# Patient Record
Sex: FEMALE | Race: BLACK OR AFRICAN AMERICAN | NOT HISPANIC OR LATINO | Employment: OTHER | URBAN - METROPOLITAN AREA
[De-identification: names, ages, dates, MRNs, and addresses within clinical notes are randomized per-mention and may not be internally consistent; named-entity substitution may affect disease eponyms.]

---

## 2021-04-05 ENCOUNTER — LAB REQUISITION (OUTPATIENT)
Dept: LAB | Facility: HOSPITAL | Age: 61
End: 2021-04-05

## 2021-04-05 DIAGNOSIS — K12.2 CELLULITIS AND ABSCESS OF MOUTH: ICD-10-CM

## 2021-04-05 DIAGNOSIS — L02.11 CUTANEOUS ABSCESS OF NECK: ICD-10-CM

## 2021-04-05 LAB
ANION GAP SERPL CALCULATED.3IONS-SCNC: 10 MMOL/L (ref 5–15)
BASOPHILS # BLD AUTO: 0.04 10*3/MM3 (ref 0–0.2)
BASOPHILS NFR BLD AUTO: 1.1 % (ref 0–1.5)
BUN SERPL-MCNC: 30 MG/DL (ref 8–23)
BUN/CREAT SERPL: 8 (ref 7–25)
CALCIUM SPEC-SCNC: 8.9 MG/DL (ref 8.6–10.5)
CHLORIDE SERPL-SCNC: 97 MMOL/L (ref 98–107)
CO2 SERPL-SCNC: 26 MMOL/L (ref 22–29)
CREAT SERPL-MCNC: 3.73 MG/DL (ref 0.57–1)
CRP SERPL-MCNC: 1.18 MG/DL (ref 0–0.5)
DEPRECATED RDW RBC AUTO: 47.1 FL (ref 37–54)
EOSINOPHIL # BLD AUTO: 0.19 10*3/MM3 (ref 0–0.4)
EOSINOPHIL NFR BLD AUTO: 5.1 % (ref 0.3–6.2)
ERYTHROCYTE [DISTWIDTH] IN BLOOD BY AUTOMATED COUNT: 15.5 % (ref 12.3–15.4)
GFR SERPL CREATININE-BSD FRML MDRD: 12 ML/MIN/1.73
GFR SERPL CREATININE-BSD FRML MDRD: 15 ML/MIN/1.73
GLUCOSE SERPL-MCNC: 195 MG/DL (ref 65–99)
HCT VFR BLD AUTO: 31.5 % (ref 34–46.6)
HGB BLD-MCNC: 11.4 G/DL (ref 12–15.9)
IMM GRANULOCYTES # BLD AUTO: 0.01 10*3/MM3 (ref 0–0.05)
IMM GRANULOCYTES NFR BLD AUTO: 0.3 % (ref 0–0.5)
LYMPHOCYTES # BLD AUTO: 1.13 10*3/MM3 (ref 0.7–3.1)
LYMPHOCYTES NFR BLD AUTO: 30.5 % (ref 19.6–45.3)
MCH RBC QN AUTO: 30.8 PG (ref 26.6–33)
MCHC RBC AUTO-ENTMCNC: 36.2 G/DL (ref 31.5–35.7)
MCV RBC AUTO: 85.1 FL (ref 79–97)
MONOCYTES # BLD AUTO: 0.54 10*3/MM3 (ref 0.1–0.9)
MONOCYTES NFR BLD AUTO: 14.6 % (ref 5–12)
NEUTROPHILS NFR BLD AUTO: 1.8 10*3/MM3 (ref 1.7–7)
NEUTROPHILS NFR BLD AUTO: 48.4 % (ref 42.7–76)
NRBC BLD AUTO-RTO: 0 /100 WBC (ref 0–0.2)
PLATELET # BLD AUTO: 285 10*3/MM3 (ref 140–450)
PMV BLD AUTO: 9.5 FL (ref 6–12)
POTASSIUM SERPL-SCNC: 2.8 MMOL/L (ref 3.5–5.2)
RBC # BLD AUTO: 3.7 10*6/MM3 (ref 3.77–5.28)
SODIUM SERPL-SCNC: 133 MMOL/L (ref 136–145)
WBC # BLD AUTO: 3.71 10*3/MM3 (ref 3.4–10.8)

## 2021-04-05 PROCEDURE — 85025 COMPLETE CBC W/AUTO DIFF WBC: CPT | Performed by: INTERNAL MEDICINE

## 2021-04-05 PROCEDURE — 80048 BASIC METABOLIC PNL TOTAL CA: CPT | Performed by: INTERNAL MEDICINE

## 2021-04-05 PROCEDURE — 86140 C-REACTIVE PROTEIN: CPT | Performed by: INTERNAL MEDICINE

## 2021-04-06 ENCOUNTER — APPOINTMENT (OUTPATIENT)
Dept: GENERAL RADIOLOGY | Facility: HOSPITAL | Age: 61
End: 2021-04-06

## 2021-04-06 ENCOUNTER — HOSPITAL ENCOUNTER (OUTPATIENT)
Facility: HOSPITAL | Age: 61
Setting detail: OBSERVATION
Discharge: HOME | End: 2021-04-07
Attending: EMERGENCY MEDICINE | Admitting: INTERNAL MEDICINE

## 2021-04-06 DIAGNOSIS — L08.9 NECK INFECTION: ICD-10-CM

## 2021-04-06 DIAGNOSIS — N17.9 ACUTE RENAL FAILURE, UNSPECIFIED ACUTE RENAL FAILURE TYPE (HCC): ICD-10-CM

## 2021-04-06 DIAGNOSIS — E87.6 HYPOKALEMIA: Primary | ICD-10-CM

## 2021-04-06 PROBLEM — E11.9 DM2 (DIABETES MELLITUS, TYPE 2) (HCC): Status: ACTIVE | Noted: 2021-04-06

## 2021-04-06 PROBLEM — K04.7 DENTAL INFECTION: Status: ACTIVE | Noted: 2021-04-06

## 2021-04-06 PROBLEM — I10 HTN (HYPERTENSION): Status: ACTIVE | Noted: 2021-04-06

## 2021-04-06 LAB
ALBUMIN SERPL-MCNC: 3 G/DL (ref 3.5–5.2)
ALBUMIN/GLOB SERPL: 1 G/DL
ALP SERPL-CCNC: 71 U/L (ref 39–117)
ALT SERPL W P-5'-P-CCNC: 34 U/L (ref 1–33)
ANION GAP SERPL CALCULATED.3IONS-SCNC: 12 MMOL/L (ref 5–15)
AST SERPL-CCNC: 21 U/L (ref 1–32)
BACTERIA UR QL AUTO: ABNORMAL /HPF
BASOPHILS # BLD AUTO: 0.04 10*3/MM3 (ref 0–0.2)
BASOPHILS NFR BLD AUTO: 1 % (ref 0–1.5)
BILIRUB SERPL-MCNC: 0.9 MG/DL (ref 0–1.2)
BILIRUB UR QL STRIP: NEGATIVE
BUN SERPL-MCNC: 29 MG/DL (ref 8–23)
BUN/CREAT SERPL: 10.4 (ref 7–25)
CALCIUM SPEC-SCNC: 9 MG/DL (ref 8.6–10.5)
CHLORIDE SERPL-SCNC: 97 MMOL/L (ref 98–107)
CLARITY UR: ABNORMAL
CO2 SERPL-SCNC: 25 MMOL/L (ref 22–29)
COLOR UR: YELLOW
CREAT SERPL-MCNC: 2.8 MG/DL (ref 0.57–1)
DEPRECATED RDW RBC AUTO: 46.8 FL (ref 37–54)
EOSINOPHIL # BLD AUTO: 0.15 10*3/MM3 (ref 0–0.4)
EOSINOPHIL NFR BLD AUTO: 3.6 % (ref 0.3–6.2)
ERYTHROCYTE [DISTWIDTH] IN BLOOD BY AUTOMATED COUNT: 15.8 % (ref 12.3–15.4)
GFR SERPL CREATININE-BSD FRML MDRD: 21 ML/MIN/1.73
GLOBULIN UR ELPH-MCNC: 3 GM/DL
GLUCOSE BLDC GLUCOMTR-MCNC: 129 MG/DL (ref 70–130)
GLUCOSE BLDC GLUCOMTR-MCNC: 154 MG/DL (ref 70–130)
GLUCOSE SERPL-MCNC: 188 MG/DL (ref 65–99)
GLUCOSE UR STRIP-MCNC: NEGATIVE MG/DL
HCT VFR BLD AUTO: 29.9 % (ref 34–46.6)
HGB BLD-MCNC: 10.8 G/DL (ref 12–15.9)
HGB UR QL STRIP.AUTO: NEGATIVE
HOLD SPECIMEN: NORMAL
IMM GRANULOCYTES # BLD AUTO: 0.02 10*3/MM3 (ref 0–0.05)
IMM GRANULOCYTES NFR BLD AUTO: 0.5 % (ref 0–0.5)
KETONES UR QL STRIP: ABNORMAL
LEUKOCYTE ESTERASE UR QL STRIP.AUTO: NEGATIVE
LYMPHOCYTES # BLD AUTO: 1.02 10*3/MM3 (ref 0.7–3.1)
LYMPHOCYTES NFR BLD AUTO: 24.7 % (ref 19.6–45.3)
MAGNESIUM SERPL-MCNC: 1.8 MG/DL (ref 1.6–2.4)
MCH RBC QN AUTO: 30.2 PG (ref 26.6–33)
MCHC RBC AUTO-ENTMCNC: 36.1 G/DL (ref 31.5–35.7)
MCV RBC AUTO: 83.5 FL (ref 79–97)
MONOCYTES # BLD AUTO: 0.69 10*3/MM3 (ref 0.1–0.9)
MONOCYTES NFR BLD AUTO: 16.7 % (ref 5–12)
NEUTROPHILS NFR BLD AUTO: 2.21 10*3/MM3 (ref 1.7–7)
NEUTROPHILS NFR BLD AUTO: 53.5 % (ref 42.7–76)
NITRITE UR QL STRIP: POSITIVE
NRBC BLD AUTO-RTO: 0 /100 WBC (ref 0–0.2)
NT-PROBNP SERPL-MCNC: 193.3 PG/ML (ref 0–900)
PH UR STRIP.AUTO: <=5 [PH] (ref 5–8)
PLATELET # BLD AUTO: 237 10*3/MM3 (ref 140–450)
PMV BLD AUTO: 8.9 FL (ref 6–12)
POTASSIUM SERPL-SCNC: 2.5 MMOL/L (ref 3.5–5.2)
PROT SERPL-MCNC: 6 G/DL (ref 6–8.5)
PROT UR QL STRIP: ABNORMAL
QT INTERVAL: 412 MS
QTC INTERVAL: 484 MS
RBC # BLD AUTO: 3.58 10*6/MM3 (ref 3.77–5.28)
RBC # UR: ABNORMAL /HPF
REF LAB TEST METHOD: ABNORMAL
SARS-COV-2 RDRP RESP QL NAA+PROBE: NORMAL
SARS-COV-2 RNA PNL SPEC NAA+PROBE: NOT DETECTED
SODIUM SERPL-SCNC: 134 MMOL/L (ref 136–145)
SP GR UR STRIP: 1.02 (ref 1–1.03)
SQUAMOUS #/AREA URNS HPF: ABNORMAL /HPF
TROPONIN T SERPL-MCNC: <0.01 NG/ML (ref 0–0.03)
UROBILINOGEN UR QL STRIP: ABNORMAL
WBC # BLD AUTO: 4.13 10*3/MM3 (ref 3.4–10.8)
WBC UR QL AUTO: ABNORMAL /HPF
WHOLE BLOOD HOLD SPECIMEN: NORMAL
WHOLE BLOOD HOLD SPECIMEN: NORMAL

## 2021-04-06 PROCEDURE — 25010000003 POTASSIUM CHLORIDE 10 MEQ/100ML SOLUTION: Performed by: PHYSICIAN ASSISTANT

## 2021-04-06 PROCEDURE — 81001 URINALYSIS AUTO W/SCOPE: CPT | Performed by: PHYSICIAN ASSISTANT

## 2021-04-06 PROCEDURE — G0378 HOSPITAL OBSERVATION PER HR: HCPCS

## 2021-04-06 PROCEDURE — 25810000003 SODIUM CHLORIDE 0.9 % WITH KCL 20 MEQ 20-0.9 MEQ/L-% SOLUTION: Performed by: INTERNAL MEDICINE

## 2021-04-06 PROCEDURE — 85025 COMPLETE CBC W/AUTO DIFF WBC: CPT | Performed by: PHYSICIAN ASSISTANT

## 2021-04-06 PROCEDURE — U0004 COV-19 TEST NON-CDC HGH THRU: HCPCS | Performed by: INTERNAL MEDICINE

## 2021-04-06 PROCEDURE — 25010000002 HEPARIN (PORCINE) PER 1000 UNITS: Performed by: INTERNAL MEDICINE

## 2021-04-06 PROCEDURE — 96372 THER/PROPH/DIAG INJ SC/IM: CPT

## 2021-04-06 PROCEDURE — 87635 SARS-COV-2 COVID-19 AMP PRB: CPT | Performed by: PHYSICIAN ASSISTANT

## 2021-04-06 PROCEDURE — 82962 GLUCOSE BLOOD TEST: CPT

## 2021-04-06 PROCEDURE — 83735 ASSAY OF MAGNESIUM: CPT | Performed by: PHYSICIAN ASSISTANT

## 2021-04-06 PROCEDURE — 96361 HYDRATE IV INFUSION ADD-ON: CPT

## 2021-04-06 PROCEDURE — 99220 PR INITIAL OBSERVATION CARE/DAY 70 MINUTES: CPT | Performed by: INTERNAL MEDICINE

## 2021-04-06 PROCEDURE — 99285 EMERGENCY DEPT VISIT HI MDM: CPT

## 2021-04-06 PROCEDURE — 93005 ELECTROCARDIOGRAM TRACING: CPT | Performed by: EMERGENCY MEDICINE

## 2021-04-06 PROCEDURE — C9803 HOPD COVID-19 SPEC COLLECT: HCPCS

## 2021-04-06 PROCEDURE — 71045 X-RAY EXAM CHEST 1 VIEW: CPT

## 2021-04-06 PROCEDURE — 84484 ASSAY OF TROPONIN QUANT: CPT | Performed by: PHYSICIAN ASSISTANT

## 2021-04-06 PROCEDURE — 83880 ASSAY OF NATRIURETIC PEPTIDE: CPT | Performed by: PHYSICIAN ASSISTANT

## 2021-04-06 PROCEDURE — 96365 THER/PROPH/DIAG IV INF INIT: CPT

## 2021-04-06 PROCEDURE — 80053 COMPREHEN METABOLIC PANEL: CPT | Performed by: PHYSICIAN ASSISTANT

## 2021-04-06 RX ORDER — POTASSIUM CHLORIDE 750 MG/1
10 CAPSULE, EXTENDED RELEASE ORAL DAILY
COMMUNITY

## 2021-04-06 RX ORDER — HEPARIN SODIUM 5000 [USP'U]/ML
5000 INJECTION, SOLUTION INTRAVENOUS; SUBCUTANEOUS EVERY 8 HOURS SCHEDULED
Status: DISCONTINUED | OUTPATIENT
Start: 2021-04-06 | End: 2021-04-07 | Stop reason: HOSPADM

## 2021-04-06 RX ORDER — POTASSIUM CHLORIDE 750 MG/1
40 CAPSULE, EXTENDED RELEASE ORAL ONCE
Status: COMPLETED | OUTPATIENT
Start: 2021-04-06 | End: 2021-04-06

## 2021-04-06 RX ORDER — POTASSIUM CHLORIDE 1.5 G/1.77G
40 POWDER, FOR SOLUTION ORAL AS NEEDED
Status: DISCONTINUED | OUTPATIENT
Start: 2021-04-06 | End: 2021-04-07 | Stop reason: HOSPADM

## 2021-04-06 RX ORDER — POTASSIUM CHLORIDE 7.45 MG/ML
10 INJECTION INTRAVENOUS ONCE
Status: DISCONTINUED | OUTPATIENT
Start: 2021-04-06 | End: 2021-04-07

## 2021-04-06 RX ORDER — POTASSIUM CHLORIDE 7.45 MG/ML
10 INJECTION INTRAVENOUS ONCE
Status: COMPLETED | OUTPATIENT
Start: 2021-04-06 | End: 2021-04-06

## 2021-04-06 RX ORDER — SODIUM CHLORIDE 9 MG/ML
125 INJECTION, SOLUTION INTRAVENOUS CONTINUOUS
Status: DISCONTINUED | OUTPATIENT
Start: 2021-04-06 | End: 2021-04-07

## 2021-04-06 RX ORDER — METRONIDAZOLE 500 MG/1
500 TABLET ORAL 3 TIMES DAILY
COMMUNITY

## 2021-04-06 RX ORDER — LISINOPRIL 10 MG/1
10 TABLET ORAL DAILY
COMMUNITY
End: 2021-04-07 | Stop reason: HOSPADM

## 2021-04-06 RX ORDER — CARVEDILOL 12.5 MG/1
25 TABLET ORAL 2 TIMES DAILY WITH MEALS
Status: DISCONTINUED | OUTPATIENT
Start: 2021-04-06 | End: 2021-04-07 | Stop reason: HOSPADM

## 2021-04-06 RX ORDER — CARVEDILOL 25 MG/1
25 TABLET ORAL 2 TIMES DAILY WITH MEALS
COMMUNITY

## 2021-04-06 RX ORDER — POTASSIUM CHLORIDE 750 MG/1
40 CAPSULE, EXTENDED RELEASE ORAL AS NEEDED
Status: DISCONTINUED | OUTPATIENT
Start: 2021-04-06 | End: 2021-04-07 | Stop reason: HOSPADM

## 2021-04-06 RX ORDER — POTASSIUM CHLORIDE 750 MG/1
10 CAPSULE, EXTENDED RELEASE ORAL DAILY
Status: DISCONTINUED | OUTPATIENT
Start: 2021-04-07 | End: 2021-04-07 | Stop reason: HOSPADM

## 2021-04-06 RX ORDER — ATORVASTATIN CALCIUM 10 MG/1
10 TABLET, FILM COATED ORAL NIGHTLY
Status: DISCONTINUED | OUTPATIENT
Start: 2021-04-06 | End: 2021-04-07 | Stop reason: HOSPADM

## 2021-04-06 RX ORDER — NICOTINE POLACRILEX 4 MG
15 LOZENGE BUCCAL
Status: DISCONTINUED | OUTPATIENT
Start: 2021-04-06 | End: 2021-04-07 | Stop reason: HOSPADM

## 2021-04-06 RX ORDER — ACETAMINOPHEN 325 MG/1
650 TABLET ORAL EVERY 4 HOURS PRN
Status: DISCONTINUED | OUTPATIENT
Start: 2021-04-06 | End: 2021-04-07 | Stop reason: HOSPADM

## 2021-04-06 RX ORDER — LOSARTAN POTASSIUM 100 MG/1
100 TABLET ORAL DAILY
Status: ON HOLD | COMMUNITY
End: 2021-04-07 | Stop reason: SDUPTHER

## 2021-04-06 RX ORDER — FUROSEMIDE 20 MG/1
20 TABLET ORAL DAILY
Status: ON HOLD | COMMUNITY
End: 2021-04-07 | Stop reason: SDUPTHER

## 2021-04-06 RX ORDER — SODIUM CHLORIDE 0.9 % (FLUSH) 0.9 %
10 SYRINGE (ML) INJECTION AS NEEDED
Status: DISCONTINUED | OUTPATIENT
Start: 2021-04-06 | End: 2021-04-07 | Stop reason: HOSPADM

## 2021-04-06 RX ORDER — SACCHAROMYCES BOULARDII 250 MG
250 CAPSULE ORAL 2 TIMES DAILY
Status: DISCONTINUED | OUTPATIENT
Start: 2021-04-06 | End: 2021-04-07 | Stop reason: HOSPADM

## 2021-04-06 RX ORDER — METRONIDAZOLE 500 MG/1
500 TABLET ORAL EVERY 8 HOURS SCHEDULED
Status: DISCONTINUED | OUTPATIENT
Start: 2021-04-06 | End: 2021-04-07 | Stop reason: HOSPADM

## 2021-04-06 RX ORDER — DEXTROSE MONOHYDRATE 25 G/50ML
25 INJECTION, SOLUTION INTRAVENOUS
Status: DISCONTINUED | OUTPATIENT
Start: 2021-04-06 | End: 2021-04-07 | Stop reason: HOSPADM

## 2021-04-06 RX ORDER — ATORVASTATIN CALCIUM 10 MG/1
10 TABLET, FILM COATED ORAL NIGHTLY
COMMUNITY

## 2021-04-06 RX ORDER — SODIUM CHLORIDE AND POTASSIUM CHLORIDE 150; 900 MG/100ML; MG/100ML
100 INJECTION, SOLUTION INTRAVENOUS CONTINUOUS
Status: DISCONTINUED | OUTPATIENT
Start: 2021-04-06 | End: 2021-04-07 | Stop reason: HOSPADM

## 2021-04-06 RX ORDER — AMLODIPINE BESYLATE 10 MG/1
10 TABLET ORAL DAILY
COMMUNITY

## 2021-04-06 RX ORDER — SODIUM CHLORIDE 0.9 % (FLUSH) 0.9 %
10 SYRINGE (ML) INJECTION EVERY 12 HOURS SCHEDULED
Status: DISCONTINUED | OUTPATIENT
Start: 2021-04-06 | End: 2021-04-07 | Stop reason: HOSPADM

## 2021-04-06 RX ORDER — AMLODIPINE BESYLATE 10 MG/1
10 TABLET ORAL DAILY
Status: DISCONTINUED | OUTPATIENT
Start: 2021-04-06 | End: 2021-04-07 | Stop reason: HOSPADM

## 2021-04-06 RX ADMIN — SODIUM CHLORIDE 125 ML/HR: 9 INJECTION, SOLUTION INTRAVENOUS at 16:38

## 2021-04-06 RX ADMIN — METRONIDAZOLE 500 MG: 500 TABLET ORAL at 18:17

## 2021-04-06 RX ADMIN — CARVEDILOL 25 MG: 12.5 TABLET, FILM COATED ORAL at 18:17

## 2021-04-06 RX ADMIN — NAFCILLIN SODIUM 12 G: 2 INJECTION, POWDER, LYOPHILIZED, FOR SOLUTION INTRAMUSCULAR; INTRAVENOUS at 16:37

## 2021-04-06 RX ADMIN — POTASSIUM CHLORIDE 10 MEQ: 7.46 INJECTION, SOLUTION INTRAVENOUS at 16:46

## 2021-04-06 RX ADMIN — METRONIDAZOLE 500 MG: 500 TABLET ORAL at 21:00

## 2021-04-06 RX ADMIN — SODIUM CHLORIDE 500 ML: 9 INJECTION, SOLUTION INTRAVENOUS at 12:13

## 2021-04-06 RX ADMIN — AMLODIPINE BESYLATE 10 MG: 10 TABLET ORAL at 18:16

## 2021-04-06 RX ADMIN — SODIUM CHLORIDE 125 ML/HR: 9 INJECTION, SOLUTION INTRAVENOUS at 12:13

## 2021-04-06 RX ADMIN — POTASSIUM CHLORIDE 10 MEQ: 7.46 INJECTION, SOLUTION INTRAVENOUS at 12:15

## 2021-04-06 RX ADMIN — ATORVASTATIN CALCIUM 10 MG: 10 TABLET, FILM COATED ORAL at 21:00

## 2021-04-06 RX ADMIN — POTASSIUM CHLORIDE 10 MEQ: 7.46 INJECTION, SOLUTION INTRAVENOUS at 16:36

## 2021-04-06 RX ADMIN — SODIUM CHLORIDE, PRESERVATIVE FREE 10 ML: 5 INJECTION INTRAVENOUS at 20:56

## 2021-04-06 RX ADMIN — POTASSIUM CHLORIDE 40 MEQ: 750 CAPSULE, EXTENDED RELEASE ORAL at 12:17

## 2021-04-06 RX ADMIN — Medication 250 MG: at 21:00

## 2021-04-06 RX ADMIN — HEPARIN SODIUM 5000 UNITS: 5000 INJECTION INTRAVENOUS; SUBCUTANEOUS at 21:00

## 2021-04-06 RX ADMIN — POTASSIUM CHLORIDE AND SODIUM CHLORIDE 100 ML/HR: 900; 150 INJECTION, SOLUTION INTRAVENOUS at 16:39

## 2021-04-06 NOTE — PROGRESS NOTES
Discharge Planning Assessment   Lizzeth     Patient Name: Paola Pizano  MRN: [id number]  Today's Date: 4/6/2021    Admit Date: 4/6/2021    Discharge Needs Assessment     Row Name 04/06/21 1404       Living Environment    Lives With  spouse;child(satish), adult    Current Living Arrangements  home/apartment/condo    Primary Care Provided by  self    Provides Primary Care For  no one    Family Caregiver if Needed  child(satish), adult    Quality of Family Relationships  helpful;involved;supportive    Able to Return to Prior Arrangements  yes       Resource/Environmental Concerns    Resource/Environmental Concerns  none    Transportation Concerns  car, none       Transition Planning    Patient/Family Anticipates Transition to  home with family    Patient/Family Anticipated Services at Transition  ;home health care    Transportation Anticipated  family or friend will provide       Discharge Needs Assessment    Readmission Within the Last 30 Days  no previous admission in last 30 days    Current Outpatient/Agency/Support Group  homecare agency    Equipment Currently Used at Home  rollator;wound care supplies    Concerns to be Addressed  discharge planning    Anticipated Changes Related to Illness  none    Equipment Needed After Discharge  none    Outpatient/Agency/Support Group Needs  homecare agency        Discharge Plan     Row Name 04/06/21 1404       Plan    Plan  Initial    Plan Comments  CM spoke with patient at bedside. Patient resides in [facility] with her disabled spouse and adult son. Patient is primary caregiver for spouse. Patient is independent with ADL's. Patient does have a rollator and uses it as needed. Patient was admitted to  on 3/19 to 3/30 for a dental/neck abscess per patient. Patient has been on home IV abx. Patient's son has been infusing the IV abx. IV abx to be completed on 4/8. Patient states her home health company is [facility]. PCP is through [facility].  Patient is concerned about who will take care of her spouse while she is in the hospital since her spouse cannot take care of himself. Patient states her son is in the home at times, however, he has to work. CM will ask  to assist. Patient anticipates no discharge planning needs currently. CM following,    Final Discharge Disposition Code  30 - still a patient        Continued Care and Services - Admitted Since 4/6/2021    Coordination has not been started for this encounter.         Demographic Summary     Row Name 04/06/21 1402       General Information    Arrived From  home    Referral Source  emergency department    Reason for Consult  discharge planning    Preferred Language  English     Used During This Interaction  no    General Information Comments  PCP: [facility]        Functional Status     Row Name 04/06/21 1404       Functional Status    Usual Activity Tolerance  good    Current Activity Tolerance  moderate       Functional Status, IADL    Medications  independent    Meal Preparation  independent    Housekeeping  independent    Laundry  independent    Shopping  independent       Mental Status    General Appearance WDL  WDL       Mental Status Summary    Recent Changes in Mental Status/Cognitive Functioning  no changes       Employment/    Employment Status  disabled                Janet Tripp RN

## 2021-04-06 NOTE — PROGRESS NOTES
Continued Stay Note   Lizzeth     Patient Name: Paola Pizano  MRN: [id number]  Today's Date: 4/6/2021    Admit Date: 4/6/2021    Discharge Plan     Row Name 04/06/21 1508       Plan    Plan Home when medically stable    Patient/Family in Agreement with Plan -yes Patient    Plan Comments Spoke with patient regarding home situation/care for spouse while she is hospitalized.  States spouse is total care and can be difficult to manage, her adult son will be able to stay with spouse for a few days but not much longer.  Reports she was told today if she gets better she may be able to go home tomorrow.  Will update floor  regarding situation and plan for her to f/u with patient tomorrow.  Sharon Espinoza, Ext. [phone]    Final Discharge Disposition Code 01 - home or self-care    Row Name 04/06/21 1406       Plan    Plan Initial    Plan Comments CM spoke with patient at bedside. Patient resides in [facility] with her disabled spouse and aduly son. Patient is primary caregiver for spouse. Patient is independent with ADL's. Patient does have a rollator and uses it as needed. Patient was admitted to  on 3/19 to 3/30 for a dental/neck abscess per patient. Patient has been on home IV abx. Patient's son has been infusing the IV abx. Patient states her home health company is [facility]. PCP is through [facility]. Patient is concerned about who will take care of her spouse while she is in the hospital since her spouse cannot take care of himself. Patient states her son is in the home at times, however, he has to work. CM will ask  to assist. Patient anticipated no discharge planning needs. CM following,    Final Discharge Disposition Code 30 - still a patient        Discharge Codes    No documentation.             PIETRO Aponte

## 2021-04-06 NOTE — H&P
[facility] Medicine Services  HISTORY AND PHYSICAL    Patient Name: Paola Pizano  : 1960  MRN: [id number]  Primary Care Physician: Provider, No Known  Date of admission: 2021      Subjective   Subjective     Chief Complaint:  Abnormal lab    HPI:  Paola Pizano is a 60 y.o. female with a history of hypertension, diabetes who presents to the emergency room after having some abnormal blood work through home health.  Patient was recently admitted from approximately 3/19 through 3/30 at .  Patient is a somewhat poor historian but it sounds like began as a dental infection and ultimately had what appears to be 2 submandibular abscesses drained.  I have been unable to locate discharge summary obtained by the emergency room to verify this information.  Regardless she was discharged on continuous nafcillin infusion along with oral Flagyl.  She has follow-up on  with infectious disease at .  Blood work done through our system yesterday revealed a potassium of 2.8 and a creatinine of 3.7.  Apparently records revealed a normal creatinine at the time of discharge.  Today creatinine is already improved to 2.8, though potassium is lower at 2.5.  She says just in the last day or 2 that her appetite and taste is improving.  Feels that prior to that she was not eating and drinking very much.  She had an episode of emesis.  Her bowels have been looser than normal as well.  She has remained on her Lasix.  Home medication list shows both ACE inhibitor and ARB, she is unsure what she is taking.  White blood cell count is normal.  No fevers at home.  Overall she is feeling improved and hopeful to get home soon.  Home health has been attending to her neck incisions, minimal drainage at this point.        COVID Details: [x] No Symptoms OR  Date of Symptom Onset:  __ Date of first positive COVID test:  __       Symptoms: [] Fever []  Cough [] Shortness of breath [] Change in taste or smell        Risks: [] Direct Exposure [] High risk facility [] None known       Review of Systems   Gen- No fevers, chills  CV- No chest pain, palpitations  Resp- No cough, dyspnea  GI- No N/V/D, abd pain    All other systems reviewed and are negative.     Personal History     Past Medical History:   Diagnosis Date   • CHF (congestive heart failure) (CMS/HCC)    • Dental abscess    • Diabetes mellitus (CMS/HCC)    • Hyperlipidemia    • Hypertension        Past Surgical History:   Procedure Laterality Date   •  SECTION     • MASTECTOMY Left        Family History: family history is not on file. Otherwise pertinent FHx was reviewed and unremarkable.     Social History:  reports that she has never smoked. She has never used smokeless tobacco. She reports that she does not drink alcohol and does not use drugs.  Social History     Social History Narrative   • Not on file   Currently on unemployment.  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  Denies any tobacco, alcohol, drug use.    Medications:  Available home medication information reviewed.  Medications Prior to Admission   Medication Sig Dispense Refill Last Dose   • amLODIPine (NORVASC) 10 MG tablet Take 10 mg by mouth Daily.   Past Week at Unknown time   • atorvastatin (LIPITOR) 10 MG tablet Take 10 mg by mouth Every Night.   2021 at Unknown time   • carvedilol (COREG) 25 MG tablet Take 25 mg by mouth 2 (Two) Times a Day With Meals.   2021 at Unknown time   • Cholecalciferol 125 MCG (5000 UT) tablet Take 1 tablet by mouth Daily.   2021 at Unknown time   • furosemide (LASIX) 20 MG tablet Take 20 mg by mouth Daily.   2021 at Unknown time   • lisinopril (PRINIVIL,ZESTRIL) 10 MG tablet Take 10 mg by mouth Daily.   Past Week at Unknown time   • losartan (COZAAR) 100 MG tablet Take 100 mg by mouth Daily.   Past Week at Unknown time   • metFORMIN (GLUCOPHAGE) 500 MG tablet Take 500 mg by mouth 2 (Two) Times a Day With Meals.   Past Week at Unknown time   •  metroNIDAZOLE (FLAGYL) 500 MG tablet Take 500 mg by mouth 3 (Three) Times a Day.   4/6/2021 at Unknown time   • nafcillin 12 g in sodium chloride 0.9 % 500 mL IVPB Infuse 12 g into a venous catheter Daily.   4/6/2021 at Unknown time   • potassium chloride (MICRO-K) 10 MEQ CR capsule Take 10 mEq by mouth Daily.   4/6/2021 at Unknown time       No Known Allergies    Objective   Objective     Vital Signs:   Temp:  [99 °F (37.2 °C)] 99 °F (37.2 °C)  Heart Rate:  [75-91] 75  Resp:  [16] 16  BP: (147-157)/(97-99) 149/98       Physical Exam   Constitutional: Awake, alert  Eyes: PERRLA, sclerae anicteric, no conjunctival injection  HENT: NCAT, mucous membranes moist.  Gums well-healing from recent dental extraction.  Has 2 small incisions in the submandibular frontal area that do not appear infected, no drainage  Neck: Supple, no thyromegaly, no lymphadenopathy, trachea midline  Respiratory: Clear to auscultation bilaterally, nonlabored respirations   Cardiovascular: RRR, no murmurs, rubs, or gallops, palpable pedal pulses bilaterally  Gastrointestinal: Obese, positive bowel sounds, soft, nontender, nondistended  Musculoskeletal: No bilateral ankle edema, no clubbing or cyanosis to extremities  Psychiatric: Appropriate affect, cooperative  Neurologic: Oriented x 3, no focal deficits  Skin: No rashes      Result Review:  I have personally reviewed the results from the time of this admission to 04/06/21 1:45 PM EDT and agree with these findings:  [x]  Laboratory  []  Microbiology  [x]  Radiology  []  EKG/Telemetry   []  Cardiology/Vascular   []  Pathology  []  Old records  []  Other:  Most notable findings include:   Creatinine 2.8 and potassium of 2.5 today      LAB RESULTS:      Lab 04/06/21  1043 04/05/21  1330   WBC 4.13 3.71   HEMOGLOBIN 10.8* 11.4*   HEMATOCRIT 29.9* 31.5*   PLATELETS 237 285   NEUTROS ABS 2.21 1.80   IMMATURE GRANS (ABS) 0.02 0.01   LYMPHS ABS 1.02 1.13   MONOS ABS 0.69 0.54   EOS ABS 0.15 0.19   MCV  83.5 85.1   CRP  --  1.18*         Lab 04/06/21  1043 04/05/21  1330   SODIUM 134* 133*   POTASSIUM 2.5* 2.8*   CHLORIDE 97* 97*   CO2 25.0 26.0   ANION GAP 12.0 10.0   BUN 29* 30*   CREATININE 2.80* 3.73*   GLUCOSE 188* 195*   CALCIUM 9.0 8.9   MAGNESIUM 1.8  --          Lab 04/06/21  1043   TOTAL PROTEIN 6.0   ALBUMIN 3.00*   GLOBULIN 3.0   ALT (SGPT) 34*   AST (SGOT) 21   BILIRUBIN 0.9   ALK PHOS 71         Lab 04/06/21  1043   PROBNP 193.3   TROPONIN T <0.010                 Brief Urine Lab Results  (Last result in the past 365 days)      Color   Clarity   Blood   Leuk Est   Nitrite   Protein   CREAT   Urine HCG        04/06/21 1103 Yellow Cloudy Negative Negative Positive Trace             Microbiology Results (last 10 days)     ** No results found for the last 240 hours. **          XR Chest 1 View    Result Date: 4/6/2021  EXAMINATION: XR CHEST 1 VW-  INDICATION: Low POT; patient states abnormal lab.  COMPARISON: None.  FINDINGS: PICC line tip is seen in the distal SVC. The heart and vasculature appear normal in size. Lungs are well expanded and appear clear bilaterally. No effusion or pneumothorax is seen.      Impression: PICC line tip in the distal SVC. No evidence of active chest disease is seen.   D:  04/06/2021 E:  04/06/2021            Assessment/Plan   Assessment & Plan     Active Hospital Problems    Diagnosis  POA   • **Acute renal failure (ARF) (CMS/[facility]) [N17.9]  Yes     Priority: Medium   • Hypokalemia [E87.6]  Yes   • HTN (hypertension) [I10]  Yes   • Dental infection [K04.7]  Yes   • DM2 (diabetes mellitus, type 2) (CMS/[facility]) [E11.9]  Yes       Patient is a 60-year-old female with recent admission to  for what appears to be a dental infection leading to submandibular abscesses status post I&D.  Unclear culture data but was discharged on continuous nafcillin infusion and oral Flagyl.  Presents after home health katty blood work which showed acute renal failure and hypokalemia.    Acute renal  failure  -Creatinine was 3.7 on 4/5 and is already improved to 2.8 today.  I think most likely explanation is dehydration related to poor oral intake from her recent dental issues and possible antibiotic effect.  Just in the last day or so her intake is picked up which might explain improvement of her creatinine.  We will give her some IV fluids now.  -She was also continued on her Lasix at home and her ACE and possibly ARB as well.  Hold all of those medications for now.  -Seems less likely to be related to other factors but if no significant improvement may need to consider stopping nafcillin and checking for AIN with possible nephrology evaluation    Hypokalemia  -Normally takes potassium supplement at home.  Continue replace per protocol.  Hold Lasix    Recent dental infection with possible submandibular abscesses  -Data deficit, try to get records from  to confirm  -Plan for now is to continue IV nafcillin and oral Flagyl  -Follow-up with  infectious disease as scheduled on 4/8    Hypertension  -Resume Norvasc, Coreg  -Hold lisinopril/Cozaar/Lasix    Type 2 diabetes  -Hold Metformin given acute renal failure  -Place sign scale insulin for now    She is very hopeful to get home soon in order to take care of her .  If creatinine and potassium are better tomorrow anticipate being able to discharge home tomorrow with follow-up at  as previously scheduled.    DVT prophylaxis: Heparin      CODE STATUS:    Code Status and Medical Interventions:   Ordered at: 04/06/21 1343     Level Of Support Discussed With:    Patient     Code Status:    CPR     Medical Interventions (Level of Support Prior to Arrest):    Full       Admission Status:  I believe this patient meets OBSERVATION status, however if further evaluation or treatment plans warrant, status may change.  Based upon current information, I predict patient's care encounter to be less than or equal to 2 midnights.      Russ Lopez MD  04/06/21

## 2021-04-06 NOTE — ED PROVIDER NOTES
Subjective   Pt is a 61 yo female presneting to ED with complaints of abnormal labs. PMHx significant for DM (non insulin), Breast cancer s/p chemo / mastectomy, HTN, HLD, Anemia and Obesity. Pt sent to ED due to reports of K 2.8 and Cr 3.78. Pt denies prior hx of renal problems and takes daily potassium supplements. Pt with hx of admission to  3-19-21 to 3-30-21 due to neck infection. Pt had surgery with drainage of abscess. She has a PICC line and has been on continuous infusion of Naficillin and oral Flagyl. Per records antibiotics until 4-8-21. Pt denies any new pain or swelling to chin / neck wounds. She denies difficulty swallowing or intra oral swelling. She denies fever, chills, CP, SOB, cough, N/V/D, abdominal pain, urinary sx or leg swelling. Pt is followed by [facility] as a PCP. She denies tobacco, drug or ETOH use. Per records patient is a Jehov[employer] Witness.       History provided by:  Patient and medical records      Review of Systems   Constitutional: Negative for chills and fever.   HENT: Negative for congestion, sore throat and trouble swallowing.    Eyes: Negative for visual disturbance.   Respiratory: Negative for cough and shortness of breath.    Cardiovascular: Negative for chest pain and leg swelling.   Gastrointestinal: Negative for abdominal pain, constipation, diarrhea, nausea and vomiting.   Genitourinary: Negative for difficulty urinating, dysuria, flank pain and hematuria.   Musculoskeletal: Negative for arthralgias and back pain.   Skin: Negative for rash and wound.   Neurological: Negative for dizziness, syncope, weakness, numbness and headaches.   Psychiatric/Behavioral: Negative for confusion.   All other systems reviewed and are negative.      Past Medical History:   Diagnosis Date   • CHF (congestive heart failure) (CMS/HCC)    • Dental abscess    • Diabetes mellitus (CMS/HCC)    • Hyperlipidemia    • Hypertension        No Known Allergies    Past Surgical History:   Procedure Laterality  Date   •  SECTION     • MASTECTOMY Left        History reviewed. No pertinent family history.    Social History     Socioeconomic History   • Marital status:      Spouse name: Not on file   • Number of children: Not on file   • Years of education: Not on file   • Highest education level: Not on file   Tobacco Use   • Smoking status: Never Smoker   • Smokeless tobacco: Never Used   Substance and Sexual Activity   • Alcohol use: Never   • Drug use: Never           Objective   Physical Exam  Vitals and nursing note reviewed.   Constitutional:       Appearance: She is well-developed. She is obese.   HENT:      Head: Atraumatic.      Nose: Nose normal.   Eyes:      General: Lids are normal.      Conjunctiva/sclera: Conjunctivae normal.      Pupils: Pupils are equal, round, and reactive to light.   Neck:      Comments: Pt has x2 small open 1 cm wounds with bandage in place. No surrounding erythema. No drainage. No TTP.   Cardiovascular:      Rate and Rhythm: Normal rate and regular rhythm.      Heart sounds: Normal heart sounds.   Pulmonary:      Effort: Pulmonary effort is normal.      Breath sounds: Normal breath sounds. No wheezing.   Abdominal:      General: There is no distension.      Palpations: Abdomen is soft.      Tenderness: There is no abdominal tenderness. There is no guarding or rebound.   Musculoskeletal:         General: No tenderness. Normal range of motion.      Cervical back: Normal range of motion and neck supple.   Skin:     General: Skin is warm and dry.      Findings: No erythema or rash.   Neurological:      Mental Status: She is alert and oriented to person, place, and time.      Sensory: No sensory deficit.   Psychiatric:         Speech: Speech normal.         Behavior: Behavior normal.         Procedures           ED Course  ED Course as of  1222   Tue 2021   1129 Potassium(!!): 2.5 [RT]   1129 Creatinine(!): 2.80 [RT]   1129 Nitrite, UA(!): Positive [RT]   1129 WBC,  UA(!): 21-30 [RT]   1129 Bacteria, UA(!): 1+ [RT]      ED Course User Index  [RT] Sonal Boyer PA      Re-examined patient and discussed results. Pt agreeable with plan for admission.     Reviewed old records. [facility] records placed on chart. Cr 0.77 and K 3.6 on 3-30-21.    Discussed patient with Dr. Burnett who is agreeable with ED course and admission. Pt give fluids and potassium replacement in ED. Pt already on Naficillin and UA appears contaminated.     Discussed patient with Dr. Burnett who is agreeable with ED course and admission.    Recent Results (from the past 24 hour(s))   Basic Metabolic Panel    Collection Time: 04/05/21  1:30 PM    Specimen: Blood   Result Value Ref Range    Glucose 195 (H) 65 - 99 mg/dL    BUN 30 (H) 8 - 23 mg/dL    Creatinine 3.73 (H) 0.57 - 1.00 mg/dL    Sodium 133 (L) 136 - 145 mmol/L    Potassium 2.8 (L) 3.5 - 5.2 mmol/L    Chloride 97 (L) 98 - 107 mmol/L    CO2 26.0 22.0 - 29.0 mmol/L    Calcium 8.9 8.6 - 10.5 mg/dL    eGFR  African Amer 15 (L) >60 mL/min/1.73    eGFR Non African Amer 12 (L) >60 mL/min/1.73    BUN/Creatinine Ratio 8.0 7.0 - 25.0    Anion Gap 10.0 5.0 - 15.0 mmol/L   C-reactive Protein    Collection Time: 04/05/21  1:30 PM    Specimen: Blood   Result Value Ref Range    C-Reactive Protein 1.18 (H) 0.00 - 0.50 mg/dL   CBC Auto Differential    Collection Time: 04/05/21  1:30 PM    Specimen: Blood   Result Value Ref Range    WBC 3.71 3.40 - 10.80 10*3/mm3    RBC 3.70 (L) 3.77 - 5.28 10*6/mm3    Hemoglobin 11.4 (L) 12.0 - 15.9 g/dL    Hematocrit 31.5 (L) 34.0 - 46.6 %    MCV 85.1 79.0 - 97.0 fL    MCH 30.8 26.6 - 33.0 pg    MCHC 36.2 (H) 31.5 - 35.7 g/dL    RDW 15.5 (H) 12.3 - 15.4 %    RDW-SD 47.1 37.0 - 54.0 fl    MPV 9.5 6.0 - 12.0 fL    Platelets 285 140 - 450 10*3/mm3    Neutrophil % 48.4 42.7 - 76.0 %    Lymphocyte % 30.5 19.6 - 45.3 %    Monocyte % 14.6 (H) 5.0 - 12.0 %    Eosinophil % 5.1 0.3 - 6.2 %    Basophil % 1.1 0.0 - 1.5 %    Immature Grans % 0.3 0.0 -  0.5 %    Neutrophils, Absolute 1.80 1.70 - 7.00 10*3/mm3    Lymphocytes, Absolute 1.13 0.70 - 3.10 10*3/mm3    Monocytes, Absolute 0.54 0.10 - 0.90 10*3/mm3    Eosinophils, Absolute 0.19 0.00 - 0.40 10*3/mm3    Basophils, Absolute 0.04 0.00 - 0.20 10*3/mm3    Immature Grans, Absolute 0.01 0.00 - 0.05 10*3/mm3    nRBC 0.0 0.0 - 0.2 /100 WBC   Comprehensive Metabolic Panel    Collection Time: 04/06/21 10:43 AM    Specimen: Blood   Result Value Ref Range    Glucose 188 (H) 65 - 99 mg/dL    BUN 29 (H) 8 - 23 mg/dL    Creatinine 2.80 (H) 0.57 - 1.00 mg/dL    Sodium 134 (L) 136 - 145 mmol/L    Potassium 2.5 (C) 3.5 - 5.2 mmol/L    Chloride 97 (L) 98 - 107 mmol/L    CO2 25.0 22.0 - 29.0 mmol/L    Calcium 9.0 8.6 - 10.5 mg/dL    Total Protein 6.0 6.0 - 8.5 g/dL    Albumin 3.00 (L) 3.50 - 5.20 g/dL    ALT (SGPT) 34 (H) 1 - 33 U/L    AST (SGOT) 21 1 - 32 U/L    Alkaline Phosphatase 71 39 - 117 U/L    Total Bilirubin 0.9 0.0 - 1.2 mg/dL    eGFR  African Amer 21 (L) >60 mL/min/1.73    Globulin 3.0 gm/dL    A/G Ratio 1.0 g/dL    BUN/Creatinine Ratio 10.4 7.0 - 25.0    Anion Gap 12.0 5.0 - 15.0 mmol/L   Magnesium    Collection Time: 04/06/21 10:43 AM    Specimen: Blood   Result Value Ref Range    Magnesium 1.8 1.6 - 2.4 mg/dL   BNP    Collection Time: 04/06/21 10:43 AM    Specimen: Blood   Result Value Ref Range    proBNP 193.3 0.0 - 900.0 pg/mL   Troponin    Collection Time: 04/06/21 10:43 AM    Specimen: Blood   Result Value Ref Range    Troponin T <0.010 0.000 - 0.030 ng/mL   CBC Auto Differential    Collection Time: 04/06/21 10:43 AM    Specimen: Blood   Result Value Ref Range    WBC 4.13 3.40 - 10.80 10*3/mm3    RBC 3.58 (L) 3.77 - 5.28 10*6/mm3    Hemoglobin 10.8 (L) 12.0 - 15.9 g/dL    Hematocrit 29.9 (L) 34.0 - 46.6 %    MCV 83.5 79.0 - 97.0 fL    MCH 30.2 26.6 - 33.0 pg    MCHC 36.1 (H) 31.5 - 35.7 g/dL    RDW 15.8 (H) 12.3 - 15.4 %    RDW-SD 46.8 37.0 - 54.0 fl    MPV 8.9 6.0 - 12.0 fL    Platelets 237 140 - 450  10*3/mm3    Neutrophil % 53.5 42.7 - 76.0 %    Lymphocyte % 24.7 19.6 - 45.3 %    Monocyte % 16.7 (H) 5.0 - 12.0 %    Eosinophil % 3.6 0.3 - 6.2 %    Basophil % 1.0 0.0 - 1.5 %    Immature Grans % 0.5 0.0 - 0.5 %    Neutrophils, Absolute 2.21 1.70 - 7.00 10*3/mm3    Lymphocytes, Absolute 1.02 0.70 - 3.10 10*3/mm3    Monocytes, Absolute 0.69 0.10 - 0.90 10*3/mm3    Eosinophils, Absolute 0.15 0.00 - 0.40 10*3/mm3    Basophils, Absolute 0.04 0.00 - 0.20 10*3/mm3    Immature Grans, Absolute 0.02 0.00 - 0.05 10*3/mm3    nRBC 0.0 0.0 - 0.2 /100 WBC   Light Blue Top    Collection Time: 04/06/21 10:43 AM   Result Value Ref Range    Extra Tube hold for add-on    Green Top (Gel)    Collection Time: 04/06/21 10:43 AM   Result Value Ref Range    Extra Tube Hold for add-ons.    Lavender Top    Collection Time: 04/06/21 10:43 AM   Result Value Ref Range    Extra Tube hold for add-on    Gold Top - SST    Collection Time: 04/06/21 10:43 AM   Result Value Ref Range    Extra Tube Hold for add-ons.    Gray Top - Ice    Collection Time: 04/06/21 10:43 AM   Result Value Ref Range    Extra Tube Hold for add-ons.    Urinalysis With Microscopic If Indicated (No Culture) - Urine, Clean Catch    Collection Time: 04/06/21 11:03 AM    Specimen: Urine, Clean Catch   Result Value Ref Range    Color, UA Yellow Yellow, Straw    Appearance, UA Cloudy (A) Clear    pH, UA <=5.0 5.0 - 8.0    Specific Gravity, UA 1.016 1.001 - 1.030    Glucose, UA Negative Negative    Ketones, UA Trace (A) Negative    Bilirubin, UA Negative Negative    Blood, UA Negative Negative    Protein, UA Trace (A) Negative    Leuk Esterase, UA Negative Negative    Nitrite, UA Positive (A) Negative    Urobilinogen, UA 0.2 E.U./dL 0.2 - 1.0 E.U./dL   Urinalysis, Microscopic Only - Urine, Clean Catch    Collection Time: 04/06/21 11:03 AM    Specimen: Urine, Clean Catch   Result Value Ref Range    RBC, UA 0-2 None Seen, 0-2 /HPF    WBC, UA 21-30 (A) None Seen, 0-2 /HPF    Bacteria,  UA 1+ (A) None Seen, Trace /HPF    Squamous Epithelial Cells, UA 31-50 (A) None Seen, 0-2 /HPF    Methodology Manual Light Microscopy      Note: In addition to lab results from this visit, the labs listed above may include labs taken at another facility or during a different encounter within the last 24 hours. Please correlate lab times with ED admission and discharge times for further clarification of the services performed during this visit.    XR Chest 1 View   Preliminary Result   PICC line tip in the distal SVC. No evidence of active chest   disease is seen.        D:  04/06/2021   E:  04/06/2021            Vitals:    04/06/21 1102 04/06/21 1103 04/06/21 1130 04/06/21 1200   BP: 147/99  147/97 149/98   BP Location:       Patient Position:       Pulse: 86 80 77 75   Resp:       Temp:       TempSrc:       SpO2:  98% 98% 98%   Weight:       Height:         Medications   sodium chloride 0.9 % flush 10 mL (has no administration in time range)   potassium chloride 10 mEq in 100 mL IVPB (10 mEq Intravenous New Bag 4/6/21 1215)     And   potassium chloride 10 mEq in 100 mL IVPB (has no administration in time range)     And   potassium chloride 10 mEq in 100 mL IVPB (has no administration in time range)     And   potassium chloride 10 mEq in 100 mL IVPB (has no administration in time range)   sodium chloride 0.9 % infusion (125 mL/hr Intravenous New Bag 4/6/21 1213)   sodium chloride 0.9 % bolus 500 mL (500 mL Intravenous New Bag 4/6/21 1213)   potassium chloride (MICRO-K) CR capsule 40 mEq (40 mEq Oral Given 4/6/21 1217)     ECG/EMG Results (last 24 hours)     Procedure Component Value Units Date/Time    ECG 12 Lead [[id number]] Collected: 04/06/21 1025     Updated: 04/06/21 1027        ECG 12 Lead                                                  MD[facility]    Final diagnoses:   Hypokalemia   Acute renal failure, unspecified acute renal failure type (CMS/HCC)   Neck infection       ED Disposition  ED Disposition     ED  Disposition Condition Comment    Decision to Admit            No follow-up provider specified.       Medication List      No changes were made to your prescriptions during this visit.          Sonal Boyer PA  04/06/21 1224

## 2021-04-07 VITALS
HEIGHT: 63 IN | RESPIRATION RATE: 20 BRPM | WEIGHT: 227.6 LBS | HEART RATE: 80 BPM | OXYGEN SATURATION: 97 % | BODY MASS INDEX: 40.33 KG/M2 | DIASTOLIC BLOOD PRESSURE: 97 MMHG | TEMPERATURE: 98.5 F | SYSTOLIC BLOOD PRESSURE: 137 MMHG

## 2021-04-07 LAB
ANION GAP SERPL CALCULATED.3IONS-SCNC: 9 MMOL/L (ref 5–15)
BUN SERPL-MCNC: 21 MG/DL (ref 8–23)
BUN/CREAT SERPL: 11.2 (ref 7–25)
CALCIUM SPEC-SCNC: 8.2 MG/DL (ref 8.6–10.5)
CHLORIDE SERPL-SCNC: 106 MMOL/L (ref 98–107)
CO2 SERPL-SCNC: 22 MMOL/L (ref 22–29)
CREAT SERPL-MCNC: 1.88 MG/DL (ref 0.57–1)
GFR SERPL CREATININE-BSD FRML MDRD: 33 ML/MIN/1.73
GLUCOSE BLDC GLUCOMTR-MCNC: 167 MG/DL (ref 70–130)
GLUCOSE SERPL-MCNC: 117 MG/DL (ref 65–99)
POTASSIUM SERPL-SCNC: 3 MMOL/L (ref 3.5–5.2)
SODIUM SERPL-SCNC: 137 MMOL/L (ref 136–145)

## 2021-04-07 PROCEDURE — G0378 HOSPITAL OBSERVATION PER HR: HCPCS

## 2021-04-07 PROCEDURE — 25010000002 HEPARIN (PORCINE) PER 1000 UNITS: Performed by: INTERNAL MEDICINE

## 2021-04-07 PROCEDURE — 82962 GLUCOSE BLOOD TEST: CPT

## 2021-04-07 PROCEDURE — 25010000003 POTASSIUM CHLORIDE 10 MEQ/100ML SOLUTION: Performed by: INTERNAL MEDICINE

## 2021-04-07 PROCEDURE — 99217 PR OBSERVATION CARE DISCHARGE MANAGEMENT: CPT | Performed by: INTERNAL MEDICINE

## 2021-04-07 PROCEDURE — 80048 BASIC METABOLIC PNL TOTAL CA: CPT | Performed by: INTERNAL MEDICINE

## 2021-04-07 PROCEDURE — 96372 THER/PROPH/DIAG INJ SC/IM: CPT

## 2021-04-07 PROCEDURE — G0108 DIAB MANAGE TRN  PER INDIV: HCPCS

## 2021-04-07 RX ORDER — LOSARTAN POTASSIUM 100 MG/1
100 TABLET ORAL DAILY
Start: 2021-04-10

## 2021-04-07 RX ORDER — LISINOPRIL 10 MG/1
10 TABLET ORAL DAILY
Status: CANCELLED
Start: 2021-04-10

## 2021-04-07 RX ORDER — POTASSIUM CHLORIDE 7.45 MG/ML
10 INJECTION INTRAVENOUS
Status: DISCONTINUED | OUTPATIENT
Start: 2021-04-07 | End: 2021-04-07 | Stop reason: HOSPADM

## 2021-04-07 RX ORDER — FUROSEMIDE 20 MG/1
20 TABLET ORAL DAILY
Start: 2021-04-10

## 2021-04-07 RX ADMIN — AMLODIPINE BESYLATE 10 MG: 10 TABLET ORAL at 08:06

## 2021-04-07 RX ADMIN — SODIUM CHLORIDE, PRESERVATIVE FREE 10 ML: 5 INJECTION INTRAVENOUS at 08:06

## 2021-04-07 RX ADMIN — Medication 250 MG: at 08:06

## 2021-04-07 RX ADMIN — METRONIDAZOLE 500 MG: 500 TABLET ORAL at 05:53

## 2021-04-07 RX ADMIN — CARVEDILOL 25 MG: 12.5 TABLET, FILM COATED ORAL at 08:06

## 2021-04-07 RX ADMIN — POTASSIUM CHLORIDE 10 MEQ: 750 CAPSULE, EXTENDED RELEASE ORAL at 08:06

## 2021-04-07 RX ADMIN — POTASSIUM CHLORIDE 10 MEQ: 7.46 INJECTION, SOLUTION INTRAVENOUS at 08:05

## 2021-04-07 RX ADMIN — HEPARIN SODIUM 5000 UNITS: 5000 INJECTION INTRAVENOUS; SUBCUTANEOUS at 05:53

## 2021-04-07 NOTE — NURSING NOTE
Wocn consulted for:  Lower left jaw abscess     Wound presentation: two wounds on under side of chin and left jaw being 2 x 0.5 x 2 on the left and 1 x 0.5 x 1 central. Clean pink wound beds, open wound edges, no redness warmth or pian    Intervention performed: cleansed with NS, loosely packed woth saline dampened gauze and covered with silicone foam dressing.     Head to toe Ax performed.    Additional skin issues: no; Patient given some supplies. patient states her son comes over about once a day and can do the dressing change, he was taught at . Due to this wocn only ordered daily change although BID is standard. patient has follow up visit with  infectious disease on 4/8 who are managing the wounds.    Offloading boots in place. Waffle in place. Skin interventions in place.    Wocn will follow. Please contact with questions or concerns.

## 2021-04-07 NOTE — NURSING NOTE
Pt is A&OX3 with VSS and no c/o pain or discomfort. PT/wound to come assess pt oral abbesses. PO ABX and K+ replaced with redraw in the am. Ambulates with standby and walker. Will CTM and provide care.

## 2021-04-07 NOTE — DISCHARGE PLACEMENT REQUEST
"Weekly, Audric (60 y.o. Female)     I was able to combine both faxes.   Thanks. Labs on Friday, please    Date of Birth Social Security Number Address Home Phone MRN    1960  [address]  [address]  [facility] [address] [phone] [id number]    Episcopalian Marital Status          None        Admission Date Admission Type Admitting Provider Attending Provider Department, Room/Bed    21 Emergency Bharat Wild MD Opii, [place], MD [facility] 3F, S302/    Discharge Date Discharge Disposition Discharge Destination         Home or Self Care              Attending Provider: Bharat Wild MD    Allergies: No Known Allergies    Isolation: None   Infection: None   Code Status: CPR    Ht: 160 cm (63\")   Wt: 103 kg (227 lb 9.6 oz)    Admission Cmt: None   Principal Problem: Acute renal failure (ARF) (CMS/[facility]) [N17.9]                 Active Insurance as of 2021     Primary Coverage     Payor Plan Insurance Group Employer/Plan Group    [facility] [employer] [facility]      Payor Plan Address Payor Plan Phone Number Payor Plan Fax Number Effective Dates    [address] [address]   3/23/2016 - None Entered    [place] [address]       Subscriber Name Subscriber Birth Date Member ID       [id number] [id number]                 Emergency Contacts          No emergency contacts on file.            Insurance Information                [employer] [place] [place] Phone:     Subscriber: Weekly, Audric Subscriber#: [id number]    Group#:  Precert#:         [facility] 3F  [address] [place] [address]  Phone:  [phone]  Fax:  [phone]        Patient:     Audermias Weekly MRN:  [id number]   [address]  [address] [address]  [facility] [address] :  1960  SSN:    Phone: [phone] Sex:  F      INSURANCE PAYOR PLAN GROUP # SUBSCRIBER ID   Primary:    [employer] [place] [id number]   [id number] "   Admitting Diagnosis: Hypokalemia [E87.6]  Order Date:  2021         Case Management  Consult       (Order ID: [id number])     Diagnosis:         Priority:  Routine Expected Date:   Expiration Date:        Interval:  Once Count:    Comments: Please have home health do a CBC and CMP with lab results to PCP.  Is discharge planning needed? If yes, who is requesting discharge planning? Provider  Reason for Consult? Contact home health for labs     Specimen Type:   Specimen Source:   Specimen Taken Date:   Specimen Taken Time:                   Authorizing Provider:Bharat Wild MD  Authorizing Provider's NPI: [id number]  Order Entered By: Cynthia Echavarria RN 2021 11:48 AM     Electronically signed by: Bharat Wild MD 2021 11:48 AM        Bharat Wild MD   Physician   Medicine   Discharge Summary       Signed   Date of Service:  21   Creation Time:  21            Signed        Expand AllCollapse All      Show:Clear all  [x]Manual[x]Template[]Copied    Added by:  [x]Bharat Wild MD    []Audrey for details       [facility] Medicine Services  DISCHARGE SUMMARY     Patient Name: Paola Pizano  : 1960  MRN: [id number]     Date of Admission: 2021 10:06 AM  Date of Discharge: 2021  Primary Care Physician: Provider, No Known         Consults      No orders found for last 30 day(s).             Hospital Course      Presenting Problem:   Hypokalemia [E87.6]           Active Hospital Problems     Diagnosis   POA   • **Acute renal failure (ARF) (CMS/HCC) [N17.9]   Yes   • Hypokalemia [E87.6]   Yes   • HTN (hypertension) [I10]   Yes   • Dental infection [K04.7]   Yes   • DM2 (diabetes mellitus, type 2) (CMS/HCC) [E11.9]   Yes       Resolved Hospital Problems   No resolved problems to display.         Hospital Course:  Paola Pizano is a 60 y.o. female with history of hypertension, type 2 diabetes, recent admission to  3/19 -3/30  secondary to dental infection due to submandibular abscess s/p draining and currently on nafcillin and oral Flagyl.  She presented to [facility] ED at the request of home health due to abnormal labs.  She was found to have potassium 2.8 and creatinine of 3.7.     NELSY  -Baseline Cr unknown, however this peaked to 3.7, suspect thiswas likely medication induced, as patient was on lisinopril, losartan, and Lasix  -Creatinine is currently much improved, was 1.88 on day of discharge, recommend to continue holding lisinopril, losartan and Lasix     Hypokalemia  -Suspect this is secondary to recent diarrhea with ongoing diuretic use.   - She is s/p repletion per protocol, continue home potassium and follow-up with PCP in the next  2 days.       Hypertension  -BP currently stable, recommend that she continues home Coreg and amlodipine, continue to hold Lasix, losartan, and lisinopril for the next few days until seen by PCP, we will recommend to stop lisinopril altogether.     Recent dental infection due to submandibular abscess  - She is s/p drainage, followed by [facility] currently on nafcillin and Flagyl which she will continue as scheduled  - Patient will follow up as scheduled/8/21     Type 2 diabetes  -FSBG's were reviewed and were appropriate, patient is on Metformin at home, however due to underlying renal failure we will hold this for the next few days.     Discharge Follow Up Recommendations for outpatient labs/diagnostics:  Follow-up with PCP in the next 1 to 2 days for repeat labs  Follow-up with the [facility] as previously scheduled     Day of Discharge      HPI: No acute events overnight, patient rested well, no new complaints, anxiously waiting to go home.     Review of Systems  Gen- No fevers, chills  CV- No chest pain, palpitations  Resp- No cough, dyspnea  GI- No N/V/D, abd pain     Vital Signs:   Temp:  [98.3 °F (36.8 °C)-99 °F (37.2 °C)] 98.5 °F (36.9 °C)  Heart Rate:  [75-91] 77  Resp:  [16-20] 20  BP:  (136-175)/() 142/95      Physical Exam:  Constitutional: No acute distress, awake, alert  HENT: NCAT, mucous membranes moist  Respiratory: Clear to auscultation bilaterally, respiratory effort normal   Cardiovascular: RRR, no murmurs, rubs, or gallops  Gastrointestinal: Positive bowel sounds, soft, nontender, nondistended  Musculoskeletal: No bilateral ankle edema  Psychiatric: Appropriate affect, cooperative  Neurologic: Oriented x 3, nonfocal  Skin: No rashes     Pertinent  and/or Most Recent Results      LAB RESULTS:           Lab 04/06/21  1043 04/05/21  1330   WBC 4.13 3.71   HEMOGLOBIN 10.8* 11.4*   HEMATOCRIT 29.9* 31.5*   PLATELETS 237 285   NEUTROS ABS 2.21 1.80   IMMATURE GRANS (ABS) 0.02 0.01   LYMPHS ABS 1.02 1.13   MONOS ABS 0.69 0.54   EOS ABS 0.15 0.19   MCV 83.5 85.1   CRP  --  1.18*                Lab 04/07/21  0458 04/06/21  1043 04/05/21  1330   SODIUM 137 134* 133*   POTASSIUM 3.0* 2.5* 2.8*   CHLORIDE 106 97* 97*   CO2 22.0 25.0 26.0   ANION GAP 9.0 12.0 10.0   BUN 21 29* 30*   CREATININE 1.88* 2.80* 3.73*   GLUCOSE 117* 188* 195*   CALCIUM 8.2* 9.0 8.9   MAGNESIUM  --  1.8  --               Lab 04/06/21  1043   TOTAL PROTEIN 6.0   ALBUMIN 3.00*   GLOBULIN 3.0   ALT (SGPT) 34*   AST (SGOT) 21   BILIRUBIN 0.9   ALK PHOS 71              Lab 04/06/21  1043   PROBNP 193.3   TROPONIN T <0.010                                        Brief Urine Lab Results  (Last result in the past 365 days)       Color   Clarity   Blood   Leuk Est   Nitrite   Protein   CREAT   Urine HCG         04/06/21 1103 Yellow Cloudy Negative Negative Positive Trace                             Microbiology Results (last 10 days)      Procedure Component Value - Date/Time     COVID-19, M,T,W, APTIMA PANTHER SHUN IN-HOUSE NP/OP SWAB IN UTM/VTM/SALINE TRANSPORT MEDIA 24HR TAT - Swab, Nasopharynx [[id number]]  (Normal) Collected: 04/06/21 1500     Lab Status: Final result Specimen: Swab from Nasopharynx Updated: 04/06/21 2110        COVID19 Not Detected     Narrative:       Fact sheet for providers: https://www.fda.gov/media/553546/download      Fact sheet for patients: https://www.fda.gov/media/698050/download     Test performed by RT PCR.     COVID PRE-OP / PRE-PROCEDURE SCREENING ORDER (NO ISOLATION) - Swab, Nasopharynx [[id number]]  (Normal) Collected: 04/06/21 1249     Lab Status: Final result Specimen: Swab from Nasopharynx Updated: 04/06/21 1425     Narrative:       The following orders were created for panel order COVID PRE-OP / PRE-PROCEDURE SCREENING ORDER (NO ISOLATION) - Swab, Nasopharynx.  Procedure                               Abnormality         Status                     ---------                               -----------         ------                     COVID-19, [facility] IN-HOUS...[[id number]]  Normal              Final result                  Please view results for these tests on the individual orders.     COVID-19, [facility] IN-HOUSE,NASAL Swab (NO TRANSPORT MEDIA) 2 HR TAT - Swab, Nasopharynx [[id number]]  (Normal) Collected: 04/06/21 1249     Lab Status: Final result Specimen: Swab from Nasopharynx Updated: 04/06/21 1425       COVID19 Presumptive Negative     Narrative:       Fact sheet for providers: https://www.fda.gov/media/236484/download      Fact sheet for patients: https://www.fda.gov/media/395995/download     Test performed by PCR.  If inconsistent with clinical signs and symptoms patient should be tested with different authorized molecular test.               Radiology results from the last 10 days:   XR Chest 1 View     Result Date: 4/6/2021  EXAMINATION: XR CHEST 1 VW-  INDICATION: Low POT; patient states abnormal lab.  COMPARISON: None.  FINDINGS: PICC line tip is seen in the distal SVC. The heart and vasculature appear normal in size. Lungs are well expanded and appear clear bilaterally. No effusion or pneumothorax is seen.       PICC line tip in the distal SVC. No evidence of active chest disease is seen.   D:   04/06/2021 E:  04/06/2021  This report was finalized on 4/6/2021 9:41 PM by Dr. Damion Young MD.           Plan for Follow-up of Pending Labs/Results: none  Discharge Details               Discharge Medications            Changes to Medications      Instructions Start Date   furosemide 20 MG tablet  Commonly known as: LASIX  What changed: These instructions start on April 10, 2021. If you are unsure what to do until then, ask your doctor or other care provider.    20 mg, Oral, Daily    Start Date: April 10, 2021      losartan 100 MG tablet  Commonly known as: COZAAR  What changed: These instructions start on April 10, 2021. If you are unsure what to do until then, ask your doctor or other care provider.    100 mg, Oral, Daily    Start Date: April 10, 2021      metFORMIN 500 MG tablet  Commonly known as: GLUCOPHAGE  What changed: These instructions start on April 10, 2021. If you are unsure what to do until then, ask your doctor or other care provider.    500 mg, Oral, 2 Times Daily With Meals    Start Date: April 10, 2021                    Continue These Medications      Instructions Start Date   amLODIPine 10 MG tablet  Commonly known as: NORVASC    10 mg, Oral, Daily        atorvastatin 10 MG tablet  Commonly known as: LIPITOR    10 mg, Oral, Nightly        carvedilol 25 MG tablet  Commonly known as: COREG    25 mg, Oral, 2 Times Daily With Meals        Cholecalciferol 125 MCG (5000 UT) tablet    1 tablet, Oral, Daily        metroNIDAZOLE 500 MG tablet  Commonly known as: FLAGYL    500 mg, Oral, 3 Times Daily        nafcillin 12 g in sodium chloride 0.9 % 500 mL IVPB    12 g, Intravenous, Every 24 Hours        potassium chloride 10 MEQ CR capsule  Commonly known as: MICRO-K    10 mEq, Oral, Daily            Stop These Medications    lisinopril 10 MG tablet  Commonly known as: PRINIVIL,ZESTRIL                No Known Allergies     Discharge Disposition: Home  Home or Self Care  Diet:  Hospital:      Diet Order    Procedures   • Diet Regular; Consistent Carbohydrate         Activity: As tolerated  Restrictions or Other Recommendations:  -Please hold losartan, Lasix and Metformin until repeat labs are done this Friday, 2021 and renal function has shown improvement.  -We will discontinue lisinopril moving forward (she is on both ACEi and ARB)       CODE STATUS:        Code Status and Medical Interventions:   Ordered at: 21 1343     Level Of Support Discussed With:     Patient     Code Status:     CPR     Medical Interventions (Level of Support Prior to Arrest):     Full         No future appointments.     Bharat Wild MD  21     Time Spent on Discharge:  I spent 35 minutes on this discharge activity which included: face-to-face encounter with the patient, reviewing the data in the system, coordination of the care with the nursing staff as well as consultants, documentation, and entering orders.                         Russ Lopez MD   Physician   Hospitalist   H&P       Signed   Date of Service:  21 134   Creation Time:  21            Signed        Expand AllCollapse All      Show:Clear all  [x]Manual[x]Template[]Copied    Added by:  [x]Russ Lopez MD    [][facility] for details       [facility] Medicine Services  HISTORY AND PHYSICAL     Patient Name: Paola Weekly  : 1960  MRN: [id number]  Primary Care Physician: Provider, No Known  Date of admission: 2021           Subjective      Subjective      Chief Complaint:  Abnormal lab     HPI:  Paola Weekly is a 60 y.o. female with a history of hypertension, diabetes who presents to the emergency room after having some abnormal blood work through home health.  Patient was recently admitted from approximately 3/19 through 3/30 at .  Patient is a somewhat poor historian but it sounds like began as a dental infection and ultimately had what appears to be 2 submandibular abscesses drained.  I have been unable  to locate discharge summary obtained by the emergency room to verify this information.  Regardless she was discharged on continuous nafcillin infusion along with oral Flagyl.  She has follow-up on  with infectious disease at .  Blood work done through our system yesterday revealed a potassium of 2.8 and a creatinine of 3.7.  Apparently records revealed a normal creatinine at the time of discharge.  Today creatinine is already improved to 2.8, though potassium is lower at 2.5.  She says just in the last day or 2 that her appetite and taste is improving.  Feels that prior to that she was not eating and drinking very much.  She had an episode of emesis.  Her bowels have been looser than normal as well.  She has remained on her Lasix.  Home medication list shows both ACE inhibitor and ARB, she is unsure what she is taking.  White blood cell count is normal.  No fevers at home.  Overall she is feeling improved and hopeful to get home soon.  Home health has been attending to her neck incisions, minimal drainage at this point.           COVID Details: [x]? No Symptoms OR  Date of Symptom Onset:  __ Date of first positive COVID test:  __       Symptoms: []? Fever []?  Cough []? Shortness of breath []? Change in taste or smell       Risks:         []? Direct Exposure []? High risk facility []? None known        Review of Systems   Gen- No fevers, chills  CV- No chest pain, palpitations  Resp- No cough, dyspnea  GI- No N/V/D, abd pain     All other systems reviewed and are negative.      Personal History      Medical History        Past Medical History:   Diagnosis Date   • CHF (congestive heart failure) (CMS/HCC)     • Dental abscess     • Diabetes mellitus (CMS/HCC)     • Hyperlipidemia     • Hypertension              Surgical History         Past Surgical History:   Procedure Laterality Date   •  SECTION       • MASTECTOMY Left              Family History: family history is not on file. Otherwise pertinent FHx  was reviewed and unremarkable.      Social History:  reports that she has never smoked. She has never used smokeless tobacco. She reports that she does not drink alcohol and does not use drugs.  Social History          Social History Narrative   • Not on file   Currently on unemployment.  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  Denies any tobacco, alcohol, drug use.     Medications:  Available home medication information reviewed.  Prescriptions Prior to Admission           Medications Prior to Admission   Medication Sig Dispense Refill Last Dose   • amLODIPine (NORVASC) 10 MG tablet Take 10 mg by mouth Daily.     Past Week at Unknown time   • atorvastatin (LIPITOR) 10 MG tablet Take 10 mg by mouth Every Night.     4/5/2021 at Unknown time   • carvedilol (COREG) 25 MG tablet Take 25 mg by mouth 2 (Two) Times a Day With Meals.     4/5/2021 at Unknown time   • Cholecalciferol 125 MCG (5000 UT) tablet Take 1 tablet by mouth Daily.     4/5/2021 at Unknown time   • furosemide (LASIX) 20 MG tablet Take 20 mg by mouth Daily.     4/5/2021 at Unknown time   • lisinopril (PRINIVIL,ZESTRIL) 10 MG tablet Take 10 mg by mouth Daily.     Past Week at Unknown time   • losartan (COZAAR) 100 MG tablet Take 100 mg by mouth Daily.     Past Week at Unknown time   • metFORMIN (GLUCOPHAGE) 500 MG tablet Take 500 mg by mouth 2 (Two) Times a Day With Meals.     Past Week at Unknown time   • metroNIDAZOLE (FLAGYL) 500 MG tablet Take 500 mg by mouth 3 (Three) Times a Day.     4/6/2021 at Unknown time   • nafcillin 12 g in sodium chloride 0.9 % 500 mL IVPB Infuse 12 g into a venous catheter Daily.     4/6/2021 at Unknown time   • potassium chloride (MICRO-K) 10 MEQ CR capsule Take 10 mEq by mouth Daily.     4/6/2021 at Unknown time            No Known Allergies           Objective      Objective      Vital Signs:   Temp:  [99 °F (37.2 °C)] 99 °F (37.2 °C)  Heart Rate:  [75-91] 75  Resp:  [16] 16  BP: (147-157)/(97-99) 149/98     Physical  Exam   Constitutional: Awake, alert  Eyes: PERRLA, sclerae anicteric, no conjunctival injection  HENT: NCAT, mucous membranes moist.  Gums well-healing from recent dental extraction.  Has 2 small incisions in the submandibular frontal area that do not appear infected, no drainage  Neck: Supple, no thyromegaly, no lymphadenopathy, trachea midline  Respiratory: Clear to auscultation bilaterally, nonlabored respirations   Cardiovascular: RRR, no murmurs, rubs, or gallops, palpable pedal pulses bilaterally  Gastrointestinal: Obese, positive bowel sounds, soft, nontender, nondistended  Musculoskeletal: No bilateral ankle edema, no clubbing or cyanosis to extremities  Psychiatric: Appropriate affect, cooperative  Neurologic: Oriented x 3, no focal deficits  Skin: No rashes        Result Review:  I have personally reviewed the results from the time of this admission to 04/06/21 1:45 PM EDT and agree with these findings:  [x]?  Laboratory  []?  Microbiology  [x]?  Radiology  []?  EKG/Telemetry   []?  Cardiology/Vascular   []?  Pathology  []?  Old records  []?  Other:  Most notable findings include:   Creatinine 2.8 and potassium of 2.5 today        LAB RESULTS:           Lab 04/06/21  1043 04/05/21  1330   WBC 4.13 3.71   HEMOGLOBIN 10.8* 11.4*   HEMATOCRIT 29.9* 31.5*   PLATELETS 237 285   NEUTROS ABS 2.21 1.80   IMMATURE GRANS (ABS) 0.02 0.01   LYMPHS ABS 1.02 1.13   MONOS ABS 0.69 0.54   EOS ABS 0.15 0.19   MCV 83.5 85.1   CRP  --  1.18*               Lab 04/06/21  1043 04/05/21  1330   SODIUM 134* 133*   POTASSIUM 2.5* 2.8*   CHLORIDE 97* 97*   CO2 25.0 26.0   ANION GAP 12.0 10.0   BUN 29* 30*   CREATININE 2.80* 3.73*   GLUCOSE 188* 195*   CALCIUM 9.0 8.9   MAGNESIUM 1.8  --               Lab 04/06/21  1043   TOTAL PROTEIN 6.0   ALBUMIN 3.00*   GLOBULIN 3.0   ALT (SGPT) 34*   AST (SGOT) 21   BILIRUBIN 0.9   ALK PHOS 71              Lab 04/06/21  1043   PROBNP 193.3   TROPONIN T <0.010                                         Brief Urine Lab Results  (Last result in the past 365 days)       Color   Clarity   Blood   Leuk Est   Nitrite   Protein   CREAT   Urine HCG         04/06/21 1103 Yellow Cloudy Negative Negative Positive Trace                        Microbiology Results (last 10 days)      ** No results found for the last 240 hours. **               Radiology results from the last 24 hours:   XR Chest 1 View     Result Date: 4/6/2021  EXAMINATION: XR CHEST 1 VW-  INDICATION: Low POT; patient states abnormal lab.  COMPARISON: None.  FINDINGS: PICC line tip is seen in the distal SVC. The heart and vasculature appear normal in size. Lungs are well expanded and appear clear bilaterally. No effusion or pneumothorax is seen.       Impression: PICC line tip in the distal SVC. No evidence of active chest disease is seen.   D:  04/06/2021 E:  04/06/2021                    Assessment/Plan      Assessment & Plan             Active Hospital Problems     Diagnosis   POA   • **Acute renal failure (ARF) (CMS/[facility]) [N17.9]   Yes       Priority: Medium   • Hypokalemia [E87.6]   Yes   • HTN (hypertension) [I10]   Yes   • Dental infection [K04.7]   Yes   • DM2 (diabetes mellitus, type 2) (CMS/[facility]) [E11.9]   Yes         Patient is a 60-year-old female with recent admission to  for what appears to be a dental infection leading to submandibular abscesses status post I&D.  Unclear culture data but was discharged on continuous nafcillin infusion and oral Flagyl.  Presents after home health katty blood work which showed acute renal failure and hypokalemia.     Acute renal failure  -Creatinine was 3.7 on 4/5 and is already improved to 2.8 today.  I think most likely explanation is dehydration related to poor oral intake from her recent dental issues and possible antibiotic effect.  Just in the last day or so her intake is picked up which might explain improvement of her creatinine.  We will give her some IV fluids now.  -She was also continued on her  Lasix at home and her ACE and possibly ARB as well.  Hold all of those medications for now.  -Seems less likely to be related to other factors but if no significant improvement may need to consider stopping nafcillin and checking for AIN with possible nephrology evaluation     Hypokalemia  -Normally takes potassium supplement at home.  Continue replace per protocol.  Hold Lasix     Recent dental infection with possible submandibular abscesses  -Data deficit, try to get records from  to confirm  -Plan for now is to continue IV nafcillin and oral Flagyl  -Follow-up with  infectious disease as scheduled on 4/8     Hypertension  -Resume Norvasc, Coreg  -Hold lisinopril/Cozaar/Lasix     Type 2 diabetes  -Hold Metformin given acute renal failure  -Place sign scale insulin for now     She is very hopeful to get home soon in order to take care of her .  If creatinine and potassium are better tomorrow anticipate being able to discharge home tomorrow with follow-up at  as previously scheduled.     DVT prophylaxis: Heparin        CODE STATUS:        Code Status and Medical Interventions:   Ordered at: 04/06/21 1343     Level Of Support Discussed With:     Patient     Code Status:     CPR     Medical Interventions (Level of Support Prior to Arrest):     Full         Admission Status:  I believe this patient meets OBSERVATION status, however if further evaluation or treatment plans warrant, status may change.  Based upon current information, I predict patient's care encounter to be less than or equal to 2 midnights.        Russ Lopez MD  04/06/21

## 2021-04-07 NOTE — DISCHARGE PLACEMENT REQUEST
"Weekly, Jaya (60 y.o. Female)     Patient in observation status.    Thank you    Cynthia BILLY, RN, [facility]  [phone]    Date of Birth Social Security Number Address Home Phone MRN    1960  [address]  [address]  [facility] [address] [phone] [id number]    [employer] Marital Status          None        Admission Date Admission Type Admitting Provider Attending Provider Department, Room/Bed    21 Emergency Bharat Wild MD Opii, Wycliffe, MD [facility] 3F, S302/1    Discharge Date Discharge Disposition Discharge Destination         Home or Self Care              Attending Provider: Bharat Wild MD    Allergies: No Known Allergies    Isolation: None   Infection: None   Code Status: CPR    Ht: 160 cm (63\")   Wt: 103 kg (227 lb 9.6 oz)    Admission Cmt: None   Principal Problem: Acute renal failure (ARF) (CMS/[facility]) [N17.9]                 Active Insurance as of 2021     Primary Coverage     Payor Plan Insurance Group Employer/Plan Group    [employer] [place] [place]      Payor Plan Address Payor Plan Phone Number Payor Plan Fax Number Effective Dates    [address] [address]   3/23/2016 - None Entered    [employer] [address]       Subscriber Name Subscriber Birth Date Member ID       JAYA RICHARDS 1960 [id number]                 Emergency Contacts          No emergency contacts on file.            Insurance Information                [employer] [place] [place] Phone:     Subscriber: Weekly, Audric Subscriber#: [id number]    Group#:  Precert#:              History & Physical      Dossett, Russ LESLIE MD at 21 1344              [facility] Medicine Services  HISTORY AND PHYSICAL    Patient Name: Jaya Weekly  : 1960  MRN: [id number]  Primary Care Physician: Provider, No Known  Date of admission: 2021      Subjective   Subjective     Chief Complaint:  Abnormal lab    HPI:  Audric Weekly is " a 60 y.o. female with a history of hypertension, diabetes who presents to the emergency room after having some abnormal blood work through home health.  Patient was recently admitted from approximately 3/19 through 3/30 at .  Patient is a somewhat poor historian but it sounds like began as a dental infection and ultimately had what appears to be 2 submandibular abscesses drained.  I have been unable to locate discharge summary obtained by the emergency room to verify this information.  Regardless she was discharged on continuous nafcillin infusion along with oral Flagyl.  She has follow-up on 4/8 with infectious disease at .  Blood work done through our system yesterday revealed a potassium of 2.8 and a creatinine of 3.7.  Apparently records revealed a normal creatinine at the time of discharge.  Today creatinine is already improved to 2.8, though potassium is lower at 2.5.  She says just in the last day or 2 that her appetite and taste is improving.  Feels that prior to that she was not eating and drinking very much.  She had an episode of emesis.  Her bowels have been looser than normal as well.  She has remained on her Lasix.  Home medication list shows both ACE inhibitor and ARB, she is unsure what she is taking.  White blood cell count is normal.  No fevers at home.  Overall she is feeling improved and hopeful to get home soon.  Home health has been attending to her neck incisions, minimal drainage at this point.        COVID Details: [x] No Symptoms OR  Date of Symptom Onset:  __ Date of first positive COVID test:  __       Symptoms: [] Fever []  Cough [] Shortness of breath [] Change in taste or smell       Risks: [] Direct Exposure [] High risk facility [] None known       Review of Systems   Gen- No fevers, chills  CV- No chest pain, palpitations  Resp- No cough, dyspnea  GI- No N/V/D, abd pain    All other systems reviewed and are negative.     Personal History     Past Medical History:   Diagnosis  Date   • CHF (congestive heart failure) (CMS/HCC)    • Dental abscess    • Diabetes mellitus (CMS/HCC)    • Hyperlipidemia    • Hypertension        Past Surgical History:   Procedure Laterality Date   •  SECTION     • MASTECTOMY Left        Family History: family history is not on file. Otherwise pertinent FHx was reviewed and unremarkable.     Social History:  reports that she has never smoked. She has never used smokeless tobacco. She reports that she does not drink alcohol and does not use drugs.  Social History     Social History Narrative   • Not on file   Currently on unemployment.  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  Denies any tobacco, alcohol, drug use.    Medications:  Available home medication information reviewed.  Medications Prior to Admission   Medication Sig Dispense Refill Last Dose   • amLODIPine (NORVASC) 10 MG tablet Take 10 mg by mouth Daily.   Past Week at Unknown time   • atorvastatin (LIPITOR) 10 MG tablet Take 10 mg by mouth Every Night.   2021 at Unknown time   • carvedilol (COREG) 25 MG tablet Take 25 mg by mouth 2 (Two) Times a Day With Meals.   2021 at Unknown time   • Cholecalciferol 125 MCG (5000 UT) tablet Take 1 tablet by mouth Daily.   2021 at Unknown time   • furosemide (LASIX) 20 MG tablet Take 20 mg by mouth Daily.   2021 at Unknown time   • lisinopril (PRINIVIL,ZESTRIL) 10 MG tablet Take 10 mg by mouth Daily.   Past Week at Unknown time   • losartan (COZAAR) 100 MG tablet Take 100 mg by mouth Daily.   Past Week at Unknown time   • metFORMIN (GLUCOPHAGE) 500 MG tablet Take 500 mg by mouth 2 (Two) Times a Day With Meals.   Past Week at Unknown time   • metroNIDAZOLE (FLAGYL) 500 MG tablet Take 500 mg by mouth 3 (Three) Times a Day.   2021 at Unknown time   • nafcillin 12 g in sodium chloride 0.9 % 500 mL IVPB Infuse 12 g into a venous catheter Daily.   2021 at Unknown time   • potassium chloride (MICRO-K) 10 MEQ CR capsule Take 10 mEq by  mouth Daily.   4/6/2021 at Unknown time       No Known Allergies    Objective   Objective     Vital Signs:   Temp:  [99 °F (37.2 °C)] 99 °F (37.2 °C)  Heart Rate:  [75-91] 75  Resp:  [16] 16  BP: (147-157)/(97-99) 149/98       Physical Exam   Constitutional: Awake, alert  Eyes: PERRLA, sclerae anicteric, no conjunctival injection  HENT: NCAT, mucous membranes moist.  Gums well-healing from recent dental extraction.  Has 2 small incisions in the submandibular frontal area that do not appear infected, no drainage  Neck: Supple, no thyromegaly, no lymphadenopathy, trachea midline  Respiratory: Clear to auscultation bilaterally, nonlabored respirations   Cardiovascular: RRR, no murmurs, rubs, or gallops, palpable pedal pulses bilaterally  Gastrointestinal: Obese, positive bowel sounds, soft, nontender, nondistended  Musculoskeletal: No bilateral ankle edema, no clubbing or cyanosis to extremities  Psychiatric: Appropriate affect, cooperative  Neurologic: Oriented x 3, no focal deficits  Skin: No rashes      Result Review:  I have personally reviewed the results from the time of this admission to 04/06/21 1:45 PM EDT and agree with these findings:  [x]  Laboratory  []  Microbiology  [x]  Radiology  []  EKG/Telemetry   []  Cardiology/Vascular   []  Pathology  []  Old records  []  Other:  Most notable findings include:   Creatinine 2.8 and potassium of 2.5 today      LAB RESULTS:      Lab 04/06/21  1043 04/05/21  1330   WBC 4.13 3.71   HEMOGLOBIN 10.8* 11.4*   HEMATOCRIT 29.9* 31.5*   PLATELETS 237 285   NEUTROS ABS 2.21 1.80   IMMATURE GRANS (ABS) 0.02 0.01   LYMPHS ABS 1.02 1.13   MONOS ABS 0.69 0.54   EOS ABS 0.15 0.19   MCV 83.5 85.1   CRP  --  1.18*         Lab 04/06/21  1043 04/05/21  1330   SODIUM 134* 133*   POTASSIUM 2.5* 2.8*   CHLORIDE 97* 97*   CO2 25.0 26.0   ANION GAP 12.0 10.0   BUN 29* 30*   CREATININE 2.80* 3.73*   GLUCOSE 188* 195*   CALCIUM 9.0 8.9   MAGNESIUM 1.8  --          Lab 04/06/21  1043   TOTAL  PROTEIN 6.0   ALBUMIN 3.00*   GLOBULIN 3.0   ALT (SGPT) 34*   AST (SGOT) 21   BILIRUBIN 0.9   ALK PHOS 71         Lab 04/06/21  1043   PROBNP 193.3   TROPONIN T <0.010                 Brief Urine Lab Results  (Last result in the past 365 days)      Color   Clarity   Blood   Leuk Est   Nitrite   Protein   CREAT   Urine HCG        04/06/21 1103 Yellow Cloudy Negative Negative Positive Trace             Microbiology Results (last 10 days)     ** No results found for the last 240 hours. **          XR Chest 1 View    Result Date: 4/6/2021  EXAMINATION: XR CHEST 1 VW-  INDICATION: Low POT; patient states abnormal lab.  COMPARISON: None.  FINDINGS: PICC line tip is seen in the distal SVC. The heart and vasculature appear normal in size. Lungs are well expanded and appear clear bilaterally. No effusion or pneumothorax is seen.      Impression: PICC line tip in the distal SVC. No evidence of active chest disease is seen.   D:  04/06/2021 E:  04/06/2021            Assessment/Plan   Assessment & Plan     Active Hospital Problems    Diagnosis  POA   • **Acute renal failure (ARF) (CMS/HCC) [N17.9]  Yes     Priority: Medium   • Hypokalemia [E87.6]  Yes   • HTN (hypertension) [I10]  Yes   • Dental infection [K04.7]  Yes   • DM2 (diabetes mellitus, type 2) (CMS/HCC) [E11.9]  Yes       Patient is a 60-year-old female with recent admission to  for what appears to be a dental infection leading to submandibular abscesses status post I&D.  Unclear culture data but was discharged on continuous nafcillin infusion and oral Flagyl.  Presents after home health katty blood work which showed acute renal failure and hypokalemia.    Acute renal failure  -Creatinine was 3.7 on 4/5 and is already improved to 2.8 today.  I think most likely explanation is dehydration related to poor oral intake from her recent dental issues and possible antibiotic effect.  Just in the last day or so her intake is picked up which might explain improvement of her  creatinine.  We will give her some IV fluids now.  -She was also continued on her Lasix at home and her ACE and possibly ARB as well.  Hold all of those medications for now.  -Seems less likely to be related to other factors but if no significant improvement may need to consider stopping nafcillin and checking for AIN with possible nephrology evaluation    Hypokalemia  -Normally takes potassium supplement at home.  Continue replace per protocol.  Hold Lasix    Recent dental infection with possible submandibular abscesses  -Data deficit, try to get records from  to confirm  -Plan for now is to continue IV nafcillin and oral Flagyl  -Follow-up with  infectious disease as scheduled on 4/8    Hypertension  -Resume Norvasc, Coreg  -Hold lisinopril/Cozaar/Lasix    Type 2 diabetes  -Hold Metformin given acute renal failure  -Place sign scale insulin for now    She is very hopeful to get home soon in order to take care of her .  If creatinine and potassium are better tomorrow anticipate being able to discharge home tomorrow with follow-up at  as previously scheduled.    DVT prophylaxis: Heparin      CODE STATUS:    Code Status and Medical Interventions:   Ordered at: 04/06/21 1343     Level Of Support Discussed With:    Patient     Code Status:    CPR     Medical Interventions (Level of Support Prior to Arrest):    Full       Admission Status:  I believe this patient meets OBSERVATION status, however if further evaluation or treatment plans warrant, status may change.  Based upon current information, I predict patient's care encounter to be less than or equal to 2 midnights.      Russ Lopez MD  04/06/21      Electronically signed by Russ Lopez MD at 04/06/21 1355       Physician Progress Notes (most recent note)    No notes of this type exist for this encounter.         Consult Notes (most recent note)    No notes of this type exist for this encounter.              Discharge Summary      Junito,  MD Bharat at 21 0704              [facility] Medicine Services  DISCHARGE SUMMARY    Patient Name: Paola Pizano  : 1960  MRN: [id number]    Date of Admission: 2021 10:06 AM  Date of Discharge: 2021  Primary Care Physician: Provider, No Known    Consults     No orders found for last 30 day(s).          Hospital Course     Presenting Problem:   Hypokalemia [E87.6]    Active Hospital Problems    Diagnosis  POA   • **Acute renal failure (ARF) (CMS/HCC) [N17.9]  Yes   • Hypokalemia [E87.6]  Yes   • HTN (hypertension) [I10]  Yes   • Dental infection [K04.7]  Yes   • DM2 (diabetes mellitus, type 2) (CMS/HCC) [E11.9]  Yes      Resolved Hospital Problems   No resolved problems to display.        Hospital Course:  Paola Pizano is a 60 y.o. female with history of hypertension, type 2 diabetes, recent admission to  3/19 -3/30 secondary to dental infection due to submandibular abscess s/p draining and currently on nafcillin and oral Flagyl.  She presented to [facility] ED at the request of home health due to abnormal labs.  She was found to have potassium 2.8 and creatinine of 3.7.    NELSY  -Baseline Cr unknown, however this peaked to 3.7, suspect thiswas likely medication induced, as patient was on lisinopril, losartan, and Lasix  -Creatinine is currently much improved, was 1.88 on day of discharge, recommend to continue holding lisinopril, losartan and Lasix    Hypokalemia  -Suspect this is secondary to recent diarrhea with ongoing diuretic use.   - She is s/p repletion per protocol, continue home potassium and follow-up with PCP in the next  2 days.      Hypertension  -BP currently stable, recommend that she continues home Coreg and amlodipine, continue to hold Lasix, losartan, and lisinopril for the next few days until seen by PCP, we will recommend to stop lisinopril altogether.    Recent dental infection due to submandibular abscess  - She is s/p drainage, followed by [facility]  currently on nafcillin and Flagyl which she will continue as scheduled  - Patient will follow up as scheduled/8/21    Type 2 diabetes  -FSBG's were reviewed and were appropriate, patient is on Metformin at home, however due to underlying renal failure we will hold this for the next few days.    Discharge Follow Up Recommendations for outpatient labs/diagnostics:  Follow-up with PCP in the next 1 to 2 days for repeat labs  Follow-up with the [facility] as previously scheduled    Day of Discharge     HPI: No acute events overnight, patient rested well, no new complaints, anxiously waiting to go home.    Review of Systems  Gen- No fevers, chills  CV- No chest pain, palpitations  Resp- No cough, dyspnea  GI- No N/V/D, abd pain    Vital Signs:   Temp:  [98.3 °F (36.8 °C)-99 °F (37.2 °C)] 98.5 °F (36.9 °C)  Heart Rate:  [75-91] 77  Resp:  [16-20] 20  BP: (136-175)/() 142/95     Physical Exam:  Constitutional: No acute distress, awake, alert  HENT: NCAT, mucous membranes moist  Respiratory: Clear to auscultation bilaterally, respiratory effort normal   Cardiovascular: RRR, no murmurs, rubs, or gallops  Gastrointestinal: Positive bowel sounds, soft, nontender, nondistended  Musculoskeletal: No bilateral ankle edema  Psychiatric: Appropriate affect, cooperative  Neurologic: Oriented x 3, nonfocal  Skin: No rashes    Pertinent  and/or Most Recent Results     LAB RESULTS:      Lab 04/06/21  1043 04/05/21  1330   WBC 4.13 3.71   HEMOGLOBIN 10.8* 11.4*   HEMATOCRIT 29.9* 31.5*   PLATELETS 237 285   NEUTROS ABS 2.21 1.80   IMMATURE GRANS (ABS) 0.02 0.01   LYMPHS ABS 1.02 1.13   MONOS ABS 0.69 0.54   EOS ABS 0.15 0.19   MCV 83.5 85.1   CRP  --  1.18*         Lab 04/07/21  0458 04/06/21  1043 04/05/21  1330   SODIUM 137 134* 133*   POTASSIUM 3.0* 2.5* 2.8*   CHLORIDE 106 97* 97*   CO2 22.0 25.0 26.0   ANION GAP 9.0 12.0 10.0   BUN 21 29* 30*   CREATININE 1.88* 2.80* 3.73*   GLUCOSE 117* 188* 195*   CALCIUM 8.2* 9.0 8.9   MAGNESIUM   --  1.8  --          Lab 04/06/21  1043   TOTAL PROTEIN 6.0   ALBUMIN 3.00*   GLOBULIN 3.0   ALT (SGPT) 34*   AST (SGOT) 21   BILIRUBIN 0.9   ALK PHOS 71         Lab 04/06/21  1043   PROBNP 193.3   TROPONIN T <0.010                 Brief Urine Lab Results  (Last result in the past 365 days)      Color   Clarity   Blood   Leuk Est   Nitrite   Protein   CREAT   Urine HCG        04/06/21 1103 Yellow Cloudy Negative Negative Positive Trace             Microbiology Results (last 10 days)     Procedure Component Value - Date/Time    COVID-19, M,T,W, APTIMA PANTHER SHUN IN-HOUSE NP/OP SWAB IN UTM/VTM/SALINE TRANSPORT MEDIA 24HR TAT - Swab, Nasopharynx [[id number]]  (Normal) Collected: 04/06/21 1500    Lab Status: Final result Specimen: Swab from Nasopharynx Updated: 04/06/21 2110     COVID19 Not Detected    Narrative:      Fact sheet for providers: https://www.fda.gov/media/824158/download     Fact sheet for patients: https://www.fda.gov/media/991267/download    Test performed by RT PCR.    COVID PRE-OP / PRE-PROCEDURE SCREENING ORDER (NO ISOLATION) - Swab, Nasopharynx [[id number]]  (Normal) Collected: 04/06/21 1249    Lab Status: Final result Specimen: Swab from Nasopharynx Updated: 04/06/21 1425    Narrative:      The following orders were created for panel order COVID PRE-OP / PRE-PROCEDURE SCREENING ORDER (NO ISOLATION) - Swab, Nasopharynx.  Procedure                               Abnormality         Status                     ---------                               -----------         ------                     COVID-19, ABBOTT IN-HOUS...[[id number]]  Normal              Final result                 Please view results for these tests on the individual orders.    COVID-19, ABBOTT IN-HOUSE,NASAL Swab (NO TRANSPORT MEDIA) 2 HR TAT - Swab, Nasopharynx [[id number]]  (Normal) Collected: 04/06/21 1249    Lab Status: Final result Specimen: Swab from Nasopharynx Updated: 04/06/21 1425     COVID19 Presumptive Negative     Narrative:      Fact sheet for providers: https://www.fda.gov/media/563430/download     Fact sheet for patients: https://www.fda.gov/media/918583/download    Test performed by PCR.  If inconsistent with clinical signs and symptoms patient should be tested with different authorized molecular test.          XR Chest 1 View    Result Date: 4/6/2021  EXAMINATION: XR CHEST 1 VW-  INDICATION: Low POT; patient states abnormal lab.  COMPARISON: None.  FINDINGS: PICC line tip is seen in the distal SVC. The heart and vasculature appear normal in size. Lungs are well expanded and appear clear bilaterally. No effusion or pneumothorax is seen.      PICC line tip in the distal SVC. No evidence of active chest disease is seen.   D:  04/06/2021 E:  04/06/2021  This report was finalized on 4/6/2021 9:41 PM by Dr. Damion Young MD.              Plan for Follow-up of Pending Labs/Results: none    Discharge Details        Discharge Medications      Changes to Medications      Instructions Start Date   furosemide 20 MG tablet  Commonly known as: LASIX  What changed: These instructions start on April 10, 2021. If you are unsure what to do until then, ask your doctor or other care provider.   20 mg, Oral, Daily   Start Date: April 10, 2021     losartan 100 MG tablet  Commonly known as: COZAAR  What changed: These instructions start on April 10, 2021. If you are unsure what to do until then, ask your doctor or other care provider.   100 mg, Oral, Daily   Start Date: April 10, 2021     metFORMIN 500 MG tablet  Commonly known as: GLUCOPHAGE  What changed: These instructions start on April 10, 2021. If you are unsure what to do until then, ask your doctor or other care provider.   500 mg, Oral, 2 Times Daily With Meals   Start Date: April 10, 2021        Continue These Medications      Instructions Start Date   amLODIPine 10 MG tablet  Commonly known as: NORVASC   10 mg, Oral, Daily      atorvastatin 10 MG tablet  Commonly known as: LIPITOR   10  mg, Oral, Nightly      carvedilol 25 MG tablet  Commonly known as: COREG   25 mg, Oral, 2 Times Daily With Meals      Cholecalciferol 125 MCG (5000 UT) tablet   1 tablet, Oral, Daily      metroNIDAZOLE 500 MG tablet  Commonly known as: FLAGYL   500 mg, Oral, 3 Times Daily      nafcillin 12 g in sodium chloride 0.9 % 500 mL IVPB   12 g, Intravenous, Every 24 Hours      potassium chloride 10 MEQ CR capsule  Commonly known as: MICRO-K   10 mEq, Oral, Daily         Stop These Medications    lisinopril 10 MG tablet  Commonly known as: PRINIVIL,ZESTRIL            No Known Allergies    Discharge Disposition: Home  Home or Self Care  Diet:  Hospital:  Diet Order   Procedures   • Diet Regular; Consistent Carbohydrate       Activity: As tolerated    Restrictions or Other Recommendations:  -Please hold losartan, Lasix and Metformin until repeat labs are done this Friday, 4/9/2021 and renal function has shown improvement.  -We will discontinue lisinopril moving forward (she is on both ACEi and ARB)       CODE STATUS:    Code Status and Medical Interventions:   Ordered at: 04/06/21 1343     Level Of Support Discussed With:    Patient     Code Status:    CPR     Medical Interventions (Level of Support Prior to Arrest):    Full       No future appointments.      Bharat Wild MD  04/07/21    Time Spent on Discharge:  I spent 35 minutes on this discharge activity which included: face-to-face encounter with the patient, reviewing the data in the system, coordination of the care with the nursing staff as well as consultants, documentation, and entering orders.          Electronically signed by Bharat Wild MD at 04/07/21 8050

## 2021-04-07 NOTE — CONSULTS
" Discussed and taught patient about type 2 diabetes self-management, risk factors, and importance of blood glucose control to reduce complications. Target blood glucose readings and A1c goals per ADA were reviewed. Reviewed with patient current A1c not drawn during this visit at this time. Discussed significance of a1c and elevations. Signs, symptoms, and treatment of hyperglycemia and hypoglycemia were discussed. Lifestyle changes such as physical activity with MD approval and healthy eating were encouraged. Stressed the importance of strict blood sugar control after surgery to prevent complications such as infection and to promote healing of incision. Encouraged pt to monitor blood sugar at home 2-4  times per day and to call PCP if blood sugar is trending jameel. Encouraged to keep record of blood glucose readings to take to follow up appointment with PCP. Provided patient with copy of [employer]'s \"What is Diabetes\" handout, \"Blood Glucose Goals\" handout, and \"What is A1c\" handout. Educated patient on the plate method to build a healthier diet. Patient requesting RD services to aid in further diet management. Will place consult. Thank you for this referral.  "

## 2021-04-07 NOTE — NURSING NOTE
Scheduled the earliest appointment for f/u with [facility] on 04/13/2021.    Faxed discharge summary to [phone] at request of [facility] so they can order labs on /04/09/2021.

## 2021-04-07 NOTE — PROGRESS NOTES
Continued Stay Note   [place]     Patient Name: Paola Pizano  MRN: [id number]  Today's Date: 4/7/2021    Admit Date: 4/6/2021    Discharge Plan     Row Name 04/07/21 0841       Plan    Plan Comments  SW spoke with pt at bedside and provided pt with resources such as a private pay sitter list and [facility] resource for caregiver support as well as other aging and caregiver resources that could assist pt and her  at home. If pt is needing to stay in hospital longer, pt can use the private pay sitters if needed for help for her  at home. Pt reports that her son is staying and assisting her  at home at this time.        Discharge Codes    No documentation.             PIETRO Rock

## 2021-04-07 NOTE — DISCHARGE INSTRUCTIONS
Do not restart medications listed until April 10th and you seen your primary care provider / have labs drawn.     Discharge summary faxed to [facility] so they can schedule labwork on 04/09/2021.

## 2021-04-07 NOTE — DISCHARGE SUMMARY
[facility]  DISCHARGE SUMMARY    Patient Name: Paola Pizano  : 1960  MRN: [id number]    Date of Admission: 2021 10:06 AM  Date of Discharge: 2021  Primary Care Physician: Provider, No Known    Consults     No orders found for last 30 day(s).          Hospital Course     Presenting Problem:   Hypokalemia [E87.6]    Active Hospital Problems    Diagnosis  POA   • **Acute renal failure (ARF) (CMS/HCC) [N17.9]  Yes   • Hypokalemia [E87.6]  Yes   • HTN (hypertension) [I10]  Yes   • Dental infection [K04.7]  Yes   • DM2 (diabetes mellitus, type 2) (CMS/[facility]) [E11.9]  Yes      Resolved Hospital Problems   No resolved problems to display.        Hospital Course:  Paola Pizano is a 60 y.o. female with history of hypertension, type 2 diabetes, recent admission to  3/19 -3/30 secondary to dental infection due to submandibular abscess s/p draining and currently on nafcillin and oral Flagyl.  She presented to [facility] ED at the request of home health due to abnormal labs.  She was found to have potassium 2.8 and creatinine of 3.7.    NELSY  -Baseline Cr unknown, however this peaked to 3.7, suspect thiswas likely medication induced, as patient was on lisinopril, losartan, and Lasix  -Creatinine is currently much improved, was 1.88 on day of discharge, recommend to continue holding lisinopril, losartan and Lasix    Hypokalemia  -Suspect this is secondary to recent diarrhea with ongoing diuretic use.   - She is s/p repletion per protocol, continue home potassium and follow-up with PCP in the next  2 days.      Hypertension  -BP currently stable, recommend that she continues home Coreg and amlodipine, continue to hold Lasix, losartan, and lisinopril for the next few days until seen by PCP, we will recommend to stop lisinopril altogether.    Recent dental infection due to submandibular abscess  - She is s/p drainage, followed by [facility] currently on nafcillin and Flagyl which she  will continue as scheduled  - Patient will follow up as scheduled/8/21    Type 2 diabetes  -FSBG's were reviewed and were appropriate, patient is on Metformin at home, however due to underlying renal failure we will hold this for the next few days.    Discharge Follow Up Recommendations for outpatient labs/diagnostics:  Follow-up with PCP in the next 1 to 2 days for repeat labs  Follow-up with the [facility] as previously scheduled    Day of Discharge     HPI: No acute events overnight, patient rested well, no new complaints, anxiously waiting to go home.    Review of Systems  Gen- No fevers, chills  CV- No chest pain, palpitations  Resp- No cough, dyspnea  GI- No N/V/D, abd pain    Vital Signs:   Temp:  [98.3 °F (36.8 °C)-99 °F (37.2 °C)] 98.5 °F (36.9 °C)  Heart Rate:  [75-91] 77  Resp:  [16-20] 20  BP: (136-175)/() 142/95     Physical Exam:  Constitutional: No acute distress, awake, alert  HENT: NCAT, mucous membranes moist  Respiratory: Clear to auscultation bilaterally, respiratory effort normal   Cardiovascular: RRR, no murmurs, rubs, or gallops  Gastrointestinal: Positive bowel sounds, soft, nontender, nondistended  Musculoskeletal: No bilateral ankle edema  Psychiatric: Appropriate affect, cooperative  Neurologic: Oriented x 3, nonfocal  Skin: No rashes    Pertinent  and/or Most Recent Results     LAB RESULTS:      Lab 04/06/21  1043 04/05/21  1330   WBC 4.13 3.71   HEMOGLOBIN 10.8* 11.4*   HEMATOCRIT 29.9* 31.5*   PLATELETS 237 285   NEUTROS ABS 2.21 1.80   IMMATURE GRANS (ABS) 0.02 0.01   LYMPHS ABS 1.02 1.13   MONOS ABS 0.69 0.54   EOS ABS 0.15 0.19   MCV 83.5 85.1   CRP  --  1.18*         Lab 04/07/21  0458 04/06/21  1043 04/05/21  1330   SODIUM 137 134* 133*   POTASSIUM 3.0* 2.5* 2.8*   CHLORIDE 106 97* 97*   CO2 22.0 25.0 26.0   ANION GAP 9.0 12.0 10.0   BUN 21 29* 30*   CREATININE 1.88* 2.80* 3.73*   GLUCOSE 117* 188* 195*   CALCIUM 8.2* 9.0 8.9   MAGNESIUM  --  1.8  --          Lab 04/06/21  1045    TOTAL PROTEIN 6.0   ALBUMIN 3.00*   GLOBULIN 3.0   ALT (SGPT) 34*   AST (SGOT) 21   BILIRUBIN 0.9   ALK PHOS 71         Lab 04/06/21  1043   PROBNP 193.3   TROPONIN T <0.010                 Brief Urine Lab Results  (Last result in the past 365 days)      Color   Clarity   Blood   Leuk Est   Nitrite   Protein   CREAT   Urine HCG        04/06/21 1103 Yellow Cloudy Negative Negative Positive Trace             Microbiology Results (last 10 days)     Procedure Component Value - Date/Time    COVID-19, M,T,W, APTIMA PANTHER SHUN IN-HOUSE NP/OP SWAB IN UTM/VTM/SALINE TRANSPORT MEDIA 24HR TAT - Swab, Nasopharynx [[id number]]  (Normal) Collected: 04/06/21 1500    Lab Status: Final result Specimen: Swab from Nasopharynx Updated: 04/06/21 2110     COVID19 Not Detected    Narrative:      Fact sheet for providers: https://www.fda.gov/media/101993/download     Fact sheet for patients: https://www.fda.gov/media/989295/download    Test performed by RT PCR.    COVID PRE-OP / PRE-PROCEDURE SCREENING ORDER (NO ISOLATION) - Swab, Nasopharynx [[id number]]  (Normal) Collected: 04/06/21 1249    Lab Status: Final result Specimen: Swab from Nasopharynx Updated: 04/06/21 1425    Narrative:      The following orders were created for panel order COVID PRE-OP / PRE-PROCEDURE SCREENING ORDER (NO ISOLATION) - Swab, Nasopharynx.  Procedure                               Abnormality         Status                     ---------                               -----------         ------                     COVID-19, ABBOTT IN-HOUS...[[id number]]  Normal              Final result                 Please view results for these tests on the individual orders.    COVID-19, ABBOTT IN-HOUSE,NASAL Swab (NO TRANSPORT MEDIA) 2 HR TAT - Swab, Nasopharynx [[id number]]  (Normal) Collected: 04/06/21 1249    Lab Status: Final result Specimen: Swab from Nasopharynx Updated: 04/06/21 1425     COVID19 Presumptive Negative    Narrative:      Fact sheet for providers:  https://www.fda.gov/media/048851/download     Fact sheet for patients: https://www.fda.gov/media/845687/download    Test performed by PCR.  If inconsistent with clinical signs and symptoms patient should be tested with different authorized molecular test.          XR Chest 1 View    Result Date: 4/6/2021  EXAMINATION: XR CHEST 1 VW-  INDICATION: Low POT; patient states abnormal lab.  COMPARISON: None.  FINDINGS: PICC line tip is seen in the distal SVC. The heart and vasculature appear normal in size. Lungs are well expanded and appear clear bilaterally. No effusion or pneumothorax is seen.      PICC line tip in the distal SVC. No evidence of active chest disease is seen.   D:  04/06/2021 E:  04/06/2021  This report was finalized on 4/6/2021 9:41 PM by Dr. Damion Young MD.              Plan for Follow-up of Pending Labs/Results: none    Discharge Details        Discharge Medications      Changes to Medications      Instructions Start Date   furosemide 20 MG tablet  Commonly known as: LASIX  What changed: These instructions start on April 10, 2021. If you are unsure what to do until then, ask your doctor or other care provider.   20 mg, Oral, Daily   Start Date: April 10, 2021     losartan 100 MG tablet  Commonly known as: COZAAR  What changed: These instructions start on April 10, 2021. If you are unsure what to do until then, ask your doctor or other care provider.   100 mg, Oral, Daily   Start Date: April 10, 2021     metFORMIN 500 MG tablet  Commonly known as: GLUCOPHAGE  What changed: These instructions start on April 10, 2021. If you are unsure what to do until then, ask your doctor or other care provider.   500 mg, Oral, 2 Times Daily With Meals   Start Date: April 10, 2021        Continue These Medications      Instructions Start Date   amLODIPine 10 MG tablet  Commonly known as: NORVASC   10 mg, Oral, Daily      atorvastatin 10 MG tablet  Commonly known as: LIPITOR   10 mg, Oral, Nightly      carvedilol 25 MG  tablet  Commonly known as: COREG   25 mg, Oral, 2 Times Daily With Meals      Cholecalciferol 125 MCG (5000 UT) tablet   1 tablet, Oral, Daily      metroNIDAZOLE 500 MG tablet  Commonly known as: FLAGYL   500 mg, Oral, 3 Times Daily      nafcillin 12 g in sodium chloride 0.9 % 500 mL IVPB   12 g, Intravenous, Every 24 Hours      potassium chloride 10 MEQ CR capsule  Commonly known as: MICRO-K   10 mEq, Oral, Daily         Stop These Medications    lisinopril 10 MG tablet  Commonly known as: PRINIVIL,ZESTRIL            No Known Allergies    Discharge Disposition: Home  Home or Self Care  Diet:  Hospital:  Diet Order   Procedures   • Diet Regular; Consistent Carbohydrate       Activity: As tolerated    Restrictions or Other Recommendations:  -Please hold losartan, Lasix and Metformin until repeat labs are done this Friday, 4/9/2021 and renal function has shown improvement.  -We will discontinue lisinopril moving forward (she is on both ACEi and ARB)       CODE STATUS:    Code Status and Medical Interventions:   Ordered at: 04/06/21 1343     Level Of Support Discussed With:    Patient     Code Status:    CPR     Medical Interventions (Level of Support Prior to Arrest):    Full       No future appointments.      Bharat Wild MD  04/07/21    Time Spent on Discharge:  I spent 35 minutes on this discharge activity which included: face-to-face encounter with the patient, reviewing the data in the system, coordination of the care with the nursing staff as well as consultants, documentation, and entering orders.

## 2021-04-07 NOTE — PROGRESS NOTES
Continued Stay Note   Lizzeth     Patient Name: Paola Weekly  MRN: [id number]  Today's Date: 4/7/2021    Admit Date: 4/6/2021    Discharge Plan     Row Name 04/07/21 1157       Plan    Plan Comments  [facility] contacted with lab orders for Friday, I faxed records/orders to them.    Final Discharge Disposition Code  06 - home with home health care        Discharge Codes    No documentation.       Expected Discharge Date and Time     Expected Discharge Date Expected Discharge Time    Apr 7, 2021             Cynthia Echavarria RN

## 2021-04-09 ENCOUNTER — LAB REQUISITION (OUTPATIENT)
Dept: LAB | Facility: HOSPITAL | Age: 61
End: 2021-04-09

## 2021-04-09 DIAGNOSIS — K12.2 CELLULITIS AND ABSCESS OF MOUTH: ICD-10-CM

## 2021-04-09 DIAGNOSIS — D64.9 ANEMIA, UNSPECIFIED: ICD-10-CM

## 2021-04-09 DIAGNOSIS — I10 ESSENTIAL (PRIMARY) HYPERTENSION: ICD-10-CM

## 2021-04-09 DIAGNOSIS — E11.9 TYPE 2 DIABETES MELLITUS WITHOUT COMPLICATIONS (HCC): ICD-10-CM

## 2021-04-09 LAB
ALBUMIN SERPL-MCNC: 3.2 G/DL (ref 3.5–5.2)
ALBUMIN/GLOB SERPL: 1.2 G/DL
ALP SERPL-CCNC: 76 U/L (ref 39–117)
ALT SERPL W P-5'-P-CCNC: 29 U/L (ref 1–33)
ANION GAP SERPL CALCULATED.3IONS-SCNC: 9 MMOL/L (ref 5–15)
AST SERPL-CCNC: 21 U/L (ref 1–32)
BASOPHILS # BLD AUTO: 0.04 10*3/MM3 (ref 0–0.2)
BASOPHILS NFR BLD AUTO: 0.8 % (ref 0–1.5)
BILIRUB SERPL-MCNC: 0.9 MG/DL (ref 0–1.2)
BUN SERPL-MCNC: 10 MG/DL (ref 8–23)
BUN/CREAT SERPL: 8.3 (ref 7–25)
CALCIUM SPEC-SCNC: 8.7 MG/DL (ref 8.6–10.5)
CHLORIDE SERPL-SCNC: 101 MMOL/L (ref 98–107)
CO2 SERPL-SCNC: 24 MMOL/L (ref 22–29)
CREAT SERPL-MCNC: 1.21 MG/DL (ref 0.57–1)
DEPRECATED RDW RBC AUTO: 51 FL (ref 37–54)
EOSINOPHIL # BLD AUTO: 0.15 10*3/MM3 (ref 0–0.4)
EOSINOPHIL NFR BLD AUTO: 3.1 % (ref 0.3–6.2)
ERYTHROCYTE [DISTWIDTH] IN BLOOD BY AUTOMATED COUNT: 16.4 % (ref 12.3–15.4)
GFR SERPL CREATININE-BSD FRML MDRD: 55 ML/MIN/1.73
GLOBULIN UR ELPH-MCNC: 2.7 GM/DL
GLUCOSE SERPL-MCNC: 169 MG/DL (ref 65–99)
HCT VFR BLD AUTO: 28.7 % (ref 34–46.6)
HGB BLD-MCNC: 10.3 G/DL (ref 12–15.9)
IMM GRANULOCYTES # BLD AUTO: 0.01 10*3/MM3 (ref 0–0.05)
IMM GRANULOCYTES NFR BLD AUTO: 0.2 % (ref 0–0.5)
LYMPHOCYTES # BLD AUTO: 1.63 10*3/MM3 (ref 0.7–3.1)
LYMPHOCYTES NFR BLD AUTO: 34 % (ref 19.6–45.3)
MCH RBC QN AUTO: 31.3 PG (ref 26.6–33)
MCHC RBC AUTO-ENTMCNC: 35.9 G/DL (ref 31.5–35.7)
MCV RBC AUTO: 87.2 FL (ref 79–97)
MONOCYTES # BLD AUTO: 0.72 10*3/MM3 (ref 0.1–0.9)
MONOCYTES NFR BLD AUTO: 15 % (ref 5–12)
NEUTROPHILS NFR BLD AUTO: 2.24 10*3/MM3 (ref 1.7–7)
NEUTROPHILS NFR BLD AUTO: 46.9 % (ref 42.7–76)
NRBC BLD AUTO-RTO: 0 /100 WBC (ref 0–0.2)
PLATELET # BLD AUTO: 198 10*3/MM3 (ref 140–450)
PMV BLD AUTO: 9.9 FL (ref 6–12)
POTASSIUM SERPL-SCNC: 4.1 MMOL/L (ref 3.5–5.2)
PROT SERPL-MCNC: 5.9 G/DL (ref 6–8.5)
RBC # BLD AUTO: 3.29 10*6/MM3 (ref 3.77–5.28)
SODIUM SERPL-SCNC: 134 MMOL/L (ref 136–145)
WBC # BLD AUTO: 4.79 10*3/MM3 (ref 3.4–10.8)

## 2021-04-09 PROCEDURE — 80053 COMPREHEN METABOLIC PANEL: CPT | Performed by: PHYSICAL MEDICINE & REHABILITATION

## 2021-04-09 PROCEDURE — 85025 COMPLETE CBC W/AUTO DIFF WBC: CPT | Performed by: PHYSICAL MEDICINE & REHABILITATION
